# Patient Record
Sex: FEMALE | Race: BLACK OR AFRICAN AMERICAN | Employment: PART TIME | URBAN - METROPOLITAN AREA
[De-identification: names, ages, dates, MRNs, and addresses within clinical notes are randomized per-mention and may not be internally consistent; named-entity substitution may affect disease eponyms.]

---

## 2020-05-31 ENCOUNTER — APPOINTMENT (OUTPATIENT)
Dept: GENERAL RADIOLOGY | Age: 35
End: 2020-05-31
Attending: EMERGENCY MEDICINE
Payer: SUBSIDIZED

## 2020-05-31 ENCOUNTER — APPOINTMENT (OUTPATIENT)
Dept: CT IMAGING | Age: 35
End: 2020-05-31
Attending: EMERGENCY MEDICINE
Payer: SUBSIDIZED

## 2020-05-31 ENCOUNTER — HOSPITAL ENCOUNTER (EMERGENCY)
Age: 35
Discharge: ANOTHER HOSPITAL | End: 2020-05-31
Attending: EMERGENCY MEDICINE
Payer: SUBSIDIZED

## 2020-05-31 ENCOUNTER — HOSPITAL ENCOUNTER (INPATIENT)
Age: 35
LOS: 3 days | Discharge: HOME | DRG: 175 | End: 2020-06-03
Attending: HOSPITALIST | Admitting: FAMILY MEDICINE
Payer: SUBSIDIZED

## 2020-05-31 VITALS
DIASTOLIC BLOOD PRESSURE: 93 MMHG | HEIGHT: 64 IN | OXYGEN SATURATION: 100 % | HEART RATE: 125 BPM | WEIGHT: 240 LBS | SYSTOLIC BLOOD PRESSURE: 132 MMHG | TEMPERATURE: 98.4 F | RESPIRATION RATE: 24 BRPM | BODY MASS INDEX: 40.97 KG/M2

## 2020-05-31 DIAGNOSIS — I26.92 ACUTE SADDLE PULMONARY EMBOLISM, UNSPECIFIED WHETHER ACUTE COR PULMONALE PRESENT (HCC): Primary | ICD-10-CM

## 2020-05-31 DIAGNOSIS — R09.02 HYPOXEMIA: ICD-10-CM

## 2020-05-31 LAB
ALBUMIN SERPL-MCNC: 3.5 G/DL (ref 3.5–5)
ALBUMIN/GLOB SERPL: 0.7 {RATIO} (ref 1.2–3.5)
ALP SERPL-CCNC: 83 U/L (ref 50–136)
ALT SERPL-CCNC: 15 U/L (ref 12–65)
ANION GAP SERPL CALC-SCNC: 5 MMOL/L (ref 7–16)
APTT PPP: 27.3 SEC (ref 24.3–35.4)
AST SERPL-CCNC: 11 U/L (ref 15–37)
BASOPHILS # BLD: 0 K/UL (ref 0–0.2)
BASOPHILS NFR BLD: 0 % (ref 0–2)
BILIRUB SERPL-MCNC: 0.4 MG/DL (ref 0.2–1.1)
BUN SERPL-MCNC: 9 MG/DL (ref 6–23)
CALCIUM SERPL-MCNC: 9 MG/DL (ref 8.3–10.4)
CHLORIDE SERPL-SCNC: 106 MMOL/L (ref 98–107)
CO2 SERPL-SCNC: 26 MMOL/L (ref 21–32)
CREAT SERPL-MCNC: 0.9 MG/DL (ref 0.6–1)
DIFFERENTIAL METHOD BLD: ABNORMAL
EOSINOPHIL # BLD: 0 K/UL (ref 0–0.8)
EOSINOPHIL NFR BLD: 0 % (ref 0.5–7.8)
ERYTHROCYTE [DISTWIDTH] IN BLOOD BY AUTOMATED COUNT: 13.4 % (ref 11.9–14.6)
GLOBULIN SER CALC-MCNC: 4.7 G/DL (ref 2.3–3.5)
GLUCOSE SERPL-MCNC: 159 MG/DL (ref 65–100)
HCT VFR BLD AUTO: 39.3 % (ref 35.8–46.3)
HGB BLD-MCNC: 12.2 G/DL (ref 11.7–15.4)
IMM GRANULOCYTES # BLD AUTO: 0.1 K/UL (ref 0–0.5)
IMM GRANULOCYTES NFR BLD AUTO: 1 % (ref 0–5)
INR PPP: 1
LYMPHOCYTES # BLD: 1.2 K/UL (ref 0.5–4.6)
LYMPHOCYTES NFR BLD: 8 % (ref 13–44)
MCH RBC QN AUTO: 27.9 PG (ref 26.1–32.9)
MCHC RBC AUTO-ENTMCNC: 31 G/DL (ref 31.4–35)
MCV RBC AUTO: 89.7 FL (ref 79.6–97.8)
MONOCYTES # BLD: 0.5 K/UL (ref 0.1–1.3)
MONOCYTES NFR BLD: 3 % (ref 4–12)
NEUTS SEG # BLD: 13.8 K/UL (ref 1.7–8.2)
NEUTS SEG NFR BLD: 89 % (ref 43–78)
NRBC # BLD: 0 K/UL (ref 0–0.2)
PLATELET # BLD AUTO: 293 K/UL (ref 150–450)
PMV BLD AUTO: 10.1 FL (ref 9.4–12.3)
POTASSIUM SERPL-SCNC: 4.8 MMOL/L (ref 3.5–5.1)
PROT SERPL-MCNC: 8.2 G/DL (ref 6.3–8.2)
PROTHROMBIN TIME: 13.4 SEC (ref 12–14.7)
RBC # BLD AUTO: 4.38 M/UL (ref 4.05–5.2)
SODIUM SERPL-SCNC: 137 MMOL/L (ref 136–145)
TROPONIN-HIGH SENSITIVITY: 58.1 PG/ML (ref 0–14)
WBC # BLD AUTO: 15.5 K/UL (ref 4.3–11.1)

## 2020-05-31 PROCEDURE — 96376 TX/PRO/DX INJ SAME DRUG ADON: CPT

## 2020-05-31 PROCEDURE — 74011000258 HC RX REV CODE- 258: Performed by: EMERGENCY MEDICINE

## 2020-05-31 PROCEDURE — 74011250636 HC RX REV CODE- 250/636: Performed by: EMERGENCY MEDICINE

## 2020-05-31 PROCEDURE — 71260 CT THORAX DX C+: CPT

## 2020-05-31 PROCEDURE — 93005 ELECTROCARDIOGRAM TRACING: CPT | Performed by: EMERGENCY MEDICINE

## 2020-05-31 PROCEDURE — 85730 THROMBOPLASTIN TIME PARTIAL: CPT

## 2020-05-31 PROCEDURE — 65660000000 HC RM CCU STEPDOWN

## 2020-05-31 PROCEDURE — 84484 ASSAY OF TROPONIN QUANT: CPT

## 2020-05-31 PROCEDURE — 85610 PROTHROMBIN TIME: CPT

## 2020-05-31 PROCEDURE — 74011250637 HC RX REV CODE- 250/637: Performed by: FAMILY MEDICINE

## 2020-05-31 PROCEDURE — 74011636320 HC RX REV CODE- 636/320: Performed by: EMERGENCY MEDICINE

## 2020-05-31 PROCEDURE — 85025 COMPLETE CBC W/AUTO DIFF WBC: CPT

## 2020-05-31 PROCEDURE — 80053 COMPREHEN METABOLIC PANEL: CPT

## 2020-05-31 PROCEDURE — 99285 EMERGENCY DEPT VISIT HI MDM: CPT

## 2020-05-31 PROCEDURE — 96365 THER/PROPH/DIAG IV INF INIT: CPT

## 2020-05-31 RX ORDER — ONDANSETRON 2 MG/ML
4 INJECTION INTRAMUSCULAR; INTRAVENOUS
Status: DISCONTINUED | OUTPATIENT
Start: 2020-05-31 | End: 2020-06-03 | Stop reason: HOSPADM

## 2020-05-31 RX ORDER — HEPARIN SODIUM 5000 [USP'U]/100ML
18-36 INJECTION, SOLUTION INTRAVENOUS
Status: DISCONTINUED | OUTPATIENT
Start: 2020-05-31 | End: 2020-05-31 | Stop reason: HOSPADM

## 2020-05-31 RX ORDER — NALOXONE HYDROCHLORIDE 0.4 MG/ML
0.4 INJECTION, SOLUTION INTRAMUSCULAR; INTRAVENOUS; SUBCUTANEOUS AS NEEDED
Status: DISCONTINUED | OUTPATIENT
Start: 2020-05-31 | End: 2020-06-03 | Stop reason: HOSPADM

## 2020-05-31 RX ORDER — HEPARIN SODIUM 5000 [USP'U]/ML
80 INJECTION, SOLUTION INTRAVENOUS; SUBCUTANEOUS ONCE
Status: COMPLETED | OUTPATIENT
Start: 2020-05-31 | End: 2020-05-31

## 2020-05-31 RX ORDER — SODIUM CHLORIDE 0.9 % (FLUSH) 0.9 %
5-40 SYRINGE (ML) INJECTION EVERY 8 HOURS
Status: DISCONTINUED | OUTPATIENT
Start: 2020-06-01 | End: 2020-06-03 | Stop reason: HOSPADM

## 2020-05-31 RX ORDER — OXYCODONE HYDROCHLORIDE 5 MG/1
10 TABLET ORAL
Status: DISCONTINUED | OUTPATIENT
Start: 2020-05-31 | End: 2020-06-01

## 2020-05-31 RX ORDER — SODIUM CHLORIDE 0.9 % (FLUSH) 0.9 %
5-40 SYRINGE (ML) INJECTION AS NEEDED
Status: DISCONTINUED | OUTPATIENT
Start: 2020-05-31 | End: 2020-06-03 | Stop reason: HOSPADM

## 2020-05-31 RX ORDER — SODIUM CHLORIDE 0.9 % (FLUSH) 0.9 %
10 SYRINGE (ML) INJECTION
Status: COMPLETED | OUTPATIENT
Start: 2020-05-31 | End: 2020-05-31

## 2020-05-31 RX ORDER — HEPARIN SODIUM 5000 [USP'U]/100ML
18-36 INJECTION, SOLUTION INTRAVENOUS
Status: DISCONTINUED | OUTPATIENT
Start: 2020-06-01 | End: 2020-06-01

## 2020-05-31 RX ORDER — ACETAMINOPHEN 325 MG/1
650 TABLET ORAL
Status: DISCONTINUED | OUTPATIENT
Start: 2020-05-31 | End: 2020-06-01

## 2020-05-31 RX ADMIN — HEPARIN SODIUM 18 UNITS/KG/HR: 5000 INJECTION, SOLUTION INTRAVENOUS at 18:50

## 2020-05-31 RX ADMIN — SODIUM CHLORIDE 100 ML: 900 INJECTION, SOLUTION INTRAVENOUS at 18:12

## 2020-05-31 RX ADMIN — IOPAMIDOL 100 ML: 755 INJECTION, SOLUTION INTRAVENOUS at 18:12

## 2020-05-31 RX ADMIN — HEPARIN SODIUM 8700 UNITS: 5000 INJECTION INTRAVENOUS; SUBCUTANEOUS at 18:48

## 2020-05-31 RX ADMIN — ACETAMINOPHEN 650 MG: 325 TABLET, FILM COATED ORAL at 23:35

## 2020-05-31 RX ADMIN — Medication 10 ML: at 18:12

## 2020-05-31 NOTE — ED NOTES
TRANSFER - OUT REPORT:    Verbal report given to Herbert Bourgeois on [facility]  being transferred to [address] [place] [address] for routine progression of care       Report consisted of patients Situation, Background, Assessment and   Recommendations(SBAR). Information from the following report(s) SBAR, ED Summary, MAR, Recent Results and Cardiac Rhythm Tachycardia was reviewed with the receiving nurse. Lines:   Peripheral IV 05/31/20 Right Antecubital (Active)   Site Assessment Clean, dry, & intact 5/31/2020  5:24 PM   Phlebitis Assessment 0 5/31/2020  5:24 PM   Infiltration Assessment 0 5/31/2020  5:24 PM   Dressing Status Clean, dry, & intact 5/31/2020  5:24 PM   Dressing Type Transparent 5/31/2020  5:24 PM   Hub Color/Line Status Pink 5/31/2020  5:24 PM   Action Taken Blood drawn 5/31/2020  5:24 PM       Peripheral IV 05/31/20 Left;Upper Arm (Active)   Site Assessment Clean, dry, & intact 5/31/2020  6:39 PM   Phlebitis Assessment 0 5/31/2020  6:39 PM   Infiltration Assessment 0 5/31/2020  6:39 PM   Dressing Status Clean, dry, & intact 5/31/2020  6:39 PM   Dressing Type Transparent 5/31/2020  6:39 PM   Hub Color/Line Status Blue 5/31/2020  6:39 PM        Opportunity for questions and clarification was provided.       Patient transported with:   Monitor  O2 @ 2 liters   [employer]

## 2020-05-31 NOTE — ED NOTES
Report taken from [facility]. Heparin rate verify by this nurse and Marco A Dixon. Patient has no needs at this time.

## 2020-05-31 NOTE — ED PROVIDER NOTES
53-year-old female awoke today with some shortness of breath and palpitations. Also pain in the right calf. Patient has history of pulmonary embolism x2. She has been off her anticoagulant for a week. Denies any chest pain or syncope. No fever chills or cough. No trauma. Denies other medical problems. Denies possibility pregnancy. The history is provided by the patient. Shortness of Breath   This is a new problem. The problem occurs rarely. The current episode started 6 to 12 hours ago. The problem has not changed since onset. Associated symptoms include leg pain. Pertinent negatives include no fever, no headaches, no neck pain, no cough, no sputum production, no hemoptysis, no wheezing, no orthopnea, no chest pain (Slight chest pressure but no pain per patient), no syncope, no vomiting, no abdominal pain and no rash. She has tried nothing for the symptoms. Associated medical issues include PE and DVT. Associated medical issues do not include asthma, COPD, heart failure or recent surgery. Leg Pain    Pertinent negatives include no back pain and no neck pain. Past Medical History:   Diagnosis Date    Hx of blood clots        History reviewed. No pertinent surgical history. History reviewed. No pertinent family history.     Social History     Socioeconomic History    Marital status: Not on file     Spouse name: Not on file    Number of children: Not on file    Years of education: Not on file    Highest education level: Not on file   Occupational History    Not on file   Social Needs    Financial resource strain: Not on file    Food insecurity     Worry: Not on file     Inability: Not on file    Transportation needs     Medical: Not on file     Non-medical: Not on file   Tobacco Use    Smoking status: Light Tobacco Smoker    Smokeless tobacco: Never Used   Substance and Sexual Activity    Alcohol use: Never     Frequency: Never    Drug use: Never    Sexual activity: Not on file Lifestyle    Physical activity     Days per week: Not on file     Minutes per session: Not on file    Stress: Not on file   Relationships    Social connections     Talks on phone: Not on file     Gets together: Not on file     Attends Shinto service: Not on file     Active member of club or organization: Not on file     Attends meetings of clubs or organizations: Not on file     Relationship status: Not on file    Intimate partner violence     Fear of current or ex partner: Not on file     Emotionally abused: Not on file     Physically abused: Not on file     Forced sexual activity: Not on file   Other Topics Concern    Not on file   Social History Narrative    Not on file         ALLERGIES: Patient has no known allergies. Review of Systems   Constitutional: Negative for chills and fever. Respiratory: Positive for shortness of breath. Negative for cough, hemoptysis, sputum production and wheezing. Cardiovascular: Positive for palpitations. Negative for chest pain (Slight chest pressure but no pain per patient), orthopnea and syncope. Gastrointestinal: Negative for abdominal pain, diarrhea and vomiting. Genitourinary: Negative for dysuria and flank pain. Musculoskeletal: Positive for myalgias. Negative for back pain and neck pain. Skin: Negative for color change and rash. Neurological: Negative for syncope and headaches. All other systems reviewed and are negative. Vitals:    05/31/20 1708   BP: 138/85   Pulse: (!) 136   Resp: 18   Temp: 98.4 °F (36.9 °C)   SpO2: (!) 83%   Weight: 108.9 kg (240 lb)   Height: 5' 4\" (1.626 m)            Physical Exam  Vitals signs and nursing note reviewed. Constitutional:       General: She is not in acute distress. Appearance: She is well-developed. HENT:      Head: Normocephalic and atraumatic. Right Ear: External ear normal.      Left Ear: External ear normal.      Mouth/Throat:      Pharynx: No oropharyngeal exudate.    Eyes: Conjunctiva/sclera: Conjunctivae normal.      Pupils: Pupils are equal, round, and reactive to light. Neck:      Musculoskeletal: Normal range of motion and neck supple. Cardiovascular:      Rate and Rhythm: Regular rhythm. Tachycardia present. Heart sounds: No murmur. Pulmonary:      Effort: No respiratory distress. Breath sounds: Normal breath sounds. Abdominal:      General: Bowel sounds are normal.      Palpations: Abdomen is soft. There is no mass. Tenderness: There is no abdominal tenderness. There is no guarding or rebound. Hernia: No hernia is present. Musculoskeletal:      Right lower leg: She exhibits tenderness. No edema. Skin:     General: Skin is warm and dry. Neurological:      Mental Status: She is alert and oriented to person, place, and time. Gait: Gait normal.      Comments: Nl speech   Psychiatric:         Speech: Speech normal.          MDM  Number of Diagnoses or Management Options  Diagnosis management comments: Concern for recurrent pulmonary embolism. Patient high risk. Believe CT scan indicated.        Amount and/or Complexity of Data Reviewed  Clinical lab tests: ordered and reviewed  Tests in the radiology section of CPT®: ordered and reviewed  Tests in the medicine section of CPT®: ordered and reviewed  Independent visualization of images, tracings, or specimens: yes    Risk of Complications, Morbidity, and/or Mortality  Presenting problems: moderate  Diagnostic procedures: low  Management options: moderate    Patient Progress  Patient progress: stable         Procedures    Results Include:    Recent Results (from the past 24 hour(s))   CBC WITH AUTOMATED DIFF    Collection Time: 05/31/20  5:26 PM   Result Value Ref Range    WBC 15.5 (H) 4.3 - 11.1 K/uL    RBC 4.38 4.05 - 5.2 M/uL    HGB 12.2 11.7 - 15.4 g/dL    HCT 39.3 35.8 - 46.3 %    MCV 89.7 79.6 - 97.8 FL    MCH 27.9 26.1 - 32.9 PG    MCHC 31.0 (L) 31.4 - 35.0 g/dL    RDW 13.4 11.9 - 14.6 % PLATELET 155 383 - 963 K/uL    MPV 10.1 9.4 - 12.3 FL    ABSOLUTE NRBC 0.00 0.0 - 0.2 K/uL    DF AUTOMATED      NEUTROPHILS 89 (H) 43 - 78 %    LYMPHOCYTES 8 (L) 13 - 44 %    MONOCYTES 3 (L) 4.0 - 12.0 %    EOSINOPHILS 0 (L) 0.5 - 7.8 %    BASOPHILS 0 0.0 - 2.0 %    IMMATURE GRANULOCYTES 1 0.0 - 5.0 %    ABS. NEUTROPHILS 13.8 (H) 1.7 - 8.2 K/UL    ABS. LYMPHOCYTES 1.2 0.5 - 4.6 K/UL    ABS. MONOCYTES 0.5 0.1 - 1.3 K/UL    ABS. EOSINOPHILS 0.0 0.0 - 0.8 K/UL    ABS. BASOPHILS 0.0 0.0 - 0.2 K/UL    ABS. IMM. GRANS. 0.1 0.0 - 0.5 K/UL   METABOLIC PANEL, COMPREHENSIVE    Collection Time: 05/31/20  5:26 PM   Result Value Ref Range    Sodium 137 136 - 145 mmol/L    Potassium 4.8 3.5 - 5.1 mmol/L    Chloride 106 98 - 107 mmol/L    CO2 26 21 - 32 mmol/L    Anion gap 5 (L) 7 - 16 mmol/L    Glucose 159 (H) 65 - 100 mg/dL    BUN 9 6 - 23 MG/DL    Creatinine 0.90 0.6 - 1.0 MG/DL    GFR est AA >60 >60 ml/min/1.73m2    GFR est non-AA >60 >60 ml/min/1.73m2    Calcium 9.0 8.3 - 10.4 MG/DL    Bilirubin, total 0.4 0.2 - 1.1 MG/DL    ALT (SGPT) 15 12 - 65 U/L    AST (SGOT) 11 (L) 15 - 37 U/L    Alk. phosphatase 83 50 - 136 U/L    Protein, total 8.2 6.3 - 8.2 g/dL    Albumin 3.5 3.5 - 5.0 g/dL    Globulin 4.7 (H) 2.3 - 3.5 g/dL    A-G Ratio 0.7 (L) 1.2 - 3.5     PROTHROMBIN TIME + INR    Collection Time: 05/31/20  5:26 PM   Result Value Ref Range    Prothrombin time 13.4 12.0 - 14.7 sec    INR 1.0     PTT    Collection Time: 05/31/20  5:26 PM   Result Value Ref Range    aPTT 27.3 24.3 - 35.4 SEC   TROPONIN-HIGH SENSITIVITY    Collection Time: 05/31/20  5:26 PM   Result Value Ref Range    Troponin-High Sensitivity 58.1 (HH) 0 - 14 pg/mL     6:42 PM  Report from radiologist is saddle embolus with right heart strain. Heparin bolus and infusion immediately ordered. Patient has point still with heart rate of 130. Saturation about 95% on 2-1/2 L. Discussed with pulmonologist who will asked hospitalist to admit.   Patient had 2 PEs in the past is unsure of any cause and does not doubt that she has had a work-up. CRITICAL CARE Documentation: This patient is critically ill and there is a high probability of of imminent or life threatening deterioration in the patient's condition without immediate management. The nature of the patient's clinical problem is: Manera embolism, right heart strain, tachycardia and hypoxemia. I have spent 50 minutes in direct patient care, documentation, review of labs/xrays/old records, discussion with Family, Colleague . The time involved in the performance of separately reportable procedures was not counted toward critical care time. Jerson Hung MD; 5/31/2020 @6:42 PM       6:56 PM  Discussed with pulmonary and hospitalist.  Patient will be admitted. Notified IR. They request patient be n.p.o. after midnight.

## 2020-05-31 NOTE — ED TRIAGE NOTES
Pt c/o shortness of breath and pain in right calf that started this morning. Pt states she has a history of blood clots, last time being in 8/2019. Pt denies any long drives recently. Pt in wheelchair to room. Pt and visitor masked upon arrival to the ED. Pt states she is suppose to be taking Xarelto but has not taken it in a week due to being out.

## 2020-05-31 NOTE — ED NOTES
Report received from [facility], [facility]. Pt lying on stretcher in full cardiac monitoring. Pt with tachycardia and increased respiratory rate as before. Pt states that she only has shortness of breath with movement. Assisted pt to bedside commode without incident. Pt awaiting hospitalist assessment and transport to [facility]. Pt heparin infusion continues at 18 units/kg/hr. Med verfified with Shira Lora RN. See MAR. Will continue to monitor.

## 2020-05-31 NOTE — ED NOTES
Report to [facility]. All questions answered. Pt transported to [address] [place] [address].

## 2020-06-01 ENCOUNTER — APPOINTMENT (OUTPATIENT)
Dept: INTERVENTIONAL RADIOLOGY/VASCULAR | Age: 35
DRG: 175 | End: 2020-06-01
Attending: FAMILY MEDICINE
Payer: SUBSIDIZED

## 2020-06-01 ENCOUNTER — APPOINTMENT (OUTPATIENT)
Dept: ULTRASOUND IMAGING | Age: 35
DRG: 175 | End: 2020-06-01
Attending: HOSPITALIST
Payer: SUBSIDIZED

## 2020-06-01 LAB
ANION GAP SERPL CALC-SCNC: 8 MMOL/L (ref 7–16)
APTT PPP: 30.9 SEC (ref 24.3–35.4)
APTT PPP: 96 SEC (ref 24.3–35.4)
ATRIAL RATE: 136 BPM
BASOPHILS # BLD: 0 K/UL (ref 0–0.2)
BASOPHILS # BLD: 0 K/UL (ref 0–0.2)
BASOPHILS NFR BLD: 0 % (ref 0–2)
BASOPHILS NFR BLD: 0 % (ref 0–2)
BUN SERPL-MCNC: 10 MG/DL (ref 6–23)
CALCIUM SERPL-MCNC: 8.5 MG/DL (ref 8.3–10.4)
CALCULATED P AXIS, ECG09: 68 DEGREES
CALCULATED R AXIS, ECG10: 89 DEGREES
CALCULATED T AXIS, ECG11: 36 DEGREES
CHLORIDE SERPL-SCNC: 109 MMOL/L (ref 98–107)
CO2 SERPL-SCNC: 26 MMOL/L (ref 21–32)
CREAT SERPL-MCNC: 0.82 MG/DL (ref 0.6–1)
DIAGNOSIS, 93000: NORMAL
DIFFERENTIAL METHOD BLD: ABNORMAL
DIFFERENTIAL METHOD BLD: ABNORMAL
EOSINOPHIL # BLD: 0 K/UL (ref 0–0.8)
EOSINOPHIL # BLD: 0 K/UL (ref 0–0.8)
EOSINOPHIL NFR BLD: 0 % (ref 0.5–7.8)
EOSINOPHIL NFR BLD: 0 % (ref 0.5–7.8)
ERYTHROCYTE [DISTWIDTH] IN BLOOD BY AUTOMATED COUNT: 13.5 % (ref 11.9–14.6)
ERYTHROCYTE [DISTWIDTH] IN BLOOD BY AUTOMATED COUNT: 13.5 % (ref 11.9–14.6)
FIBRINOGEN PPP-MCNC: 448 MG/DL (ref 190–501)
GLUCOSE SERPL-MCNC: 107 MG/DL (ref 65–100)
HCT VFR BLD AUTO: 37.3 % (ref 35.8–46.3)
HCT VFR BLD AUTO: 37.5 % (ref 35.8–46.3)
HGB BLD-MCNC: 11.3 G/DL (ref 11.7–15.4)
HGB BLD-MCNC: 11.8 G/DL (ref 11.7–15.4)
IMM GRANULOCYTES # BLD AUTO: 0 K/UL (ref 0–0.5)
IMM GRANULOCYTES # BLD AUTO: 0.1 K/UL (ref 0–0.5)
IMM GRANULOCYTES NFR BLD AUTO: 0 % (ref 0–5)
IMM GRANULOCYTES NFR BLD AUTO: 1 % (ref 0–5)
LYMPHOCYTES # BLD: 1.8 K/UL (ref 0.5–4.6)
LYMPHOCYTES # BLD: 2.2 K/UL (ref 0.5–4.6)
LYMPHOCYTES NFR BLD: 18 % (ref 13–44)
LYMPHOCYTES NFR BLD: 20 % (ref 13–44)
MCH RBC QN AUTO: 27.2 PG (ref 26.1–32.9)
MCH RBC QN AUTO: 27.9 PG (ref 26.1–32.9)
MCHC RBC AUTO-ENTMCNC: 30.3 G/DL (ref 31.4–35)
MCHC RBC AUTO-ENTMCNC: 31.5 G/DL (ref 31.4–35)
MCV RBC AUTO: 88.7 FL (ref 79.6–97.8)
MCV RBC AUTO: 89.9 FL (ref 79.6–97.8)
MONOCYTES # BLD: 0.5 K/UL (ref 0.1–1.3)
MONOCYTES # BLD: 0.7 K/UL (ref 0.1–1.3)
MONOCYTES NFR BLD: 5 % (ref 4–12)
MONOCYTES NFR BLD: 6 % (ref 4–12)
NEUTS SEG # BLD: 7.8 K/UL (ref 1.7–8.2)
NEUTS SEG # BLD: 7.9 K/UL (ref 1.7–8.2)
NEUTS SEG NFR BLD: 73 % (ref 43–78)
NEUTS SEG NFR BLD: 77 % (ref 43–78)
NRBC # BLD: 0 K/UL (ref 0–0.2)
NRBC # BLD: 0 K/UL (ref 0–0.2)
P-R INTERVAL, ECG05: 136 MS
PLATELET # BLD AUTO: 249 K/UL (ref 150–450)
PLATELET # BLD AUTO: 276 K/UL (ref 150–450)
PMV BLD AUTO: 10.6 FL (ref 9.4–12.3)
PMV BLD AUTO: 10.6 FL (ref 9.4–12.3)
POTASSIUM SERPL-SCNC: 4.4 MMOL/L (ref 3.5–5.1)
Q-T INTERVAL, ECG07: 292 MS
QRS DURATION, ECG06: 70 MS
QTC CALCULATION (BEZET), ECG08: 439 MS
RBC # BLD AUTO: 4.15 M/UL (ref 4.05–5.2)
RBC # BLD AUTO: 4.23 M/UL (ref 4.05–5.2)
SODIUM SERPL-SCNC: 143 MMOL/L (ref 136–145)
VENTRICULAR RATE, ECG03: 136 BPM
WBC # BLD AUTO: 10.2 K/UL (ref 4.3–11.1)
WBC # BLD AUTO: 10.9 K/UL (ref 4.3–11.1)

## 2020-06-01 PROCEDURE — 3E06317 INTRODUCTION OF OTHER THROMBOLYTIC INTO CENTRAL ARTERY, PERCUTANEOUS APPROACH: ICD-10-PCS | Performed by: RADIOLOGY

## 2020-06-01 PROCEDURE — 02HP33Z INSERTION OF INFUSION DEVICE INTO PULMONARY TRUNK, PERCUTANEOUS APPROACH: ICD-10-PCS | Performed by: RADIOLOGY

## 2020-06-01 PROCEDURE — 77030004558 HC CATH ANGI DX SUPR TORQ CARD -A

## 2020-06-01 PROCEDURE — 93306 TTE W/DOPPLER COMPLETE: CPT

## 2020-06-01 PROCEDURE — C1894 INTRO/SHEATH, NON-LASER: HCPCS

## 2020-06-01 PROCEDURE — 74011636320 HC RX REV CODE- 636/320: Performed by: RADIOLOGY

## 2020-06-01 PROCEDURE — 85025 COMPLETE CBC W/AUTO DIFF WBC: CPT

## 2020-06-01 PROCEDURE — 65620000000 HC RM CCU GENERAL

## 2020-06-01 PROCEDURE — 80048 BASIC METABOLIC PNL TOTAL CA: CPT

## 2020-06-01 PROCEDURE — C1769 GUIDE WIRE: HCPCS

## 2020-06-01 PROCEDURE — 85730 THROMBOPLASTIN TIME PARTIAL: CPT

## 2020-06-01 PROCEDURE — 36592 COLLECT BLOOD FROM PICC: CPT

## 2020-06-01 PROCEDURE — 36415 COLL VENOUS BLD VENIPUNCTURE: CPT

## 2020-06-01 PROCEDURE — 74011250636 HC RX REV CODE- 250/636: Performed by: RADIOLOGY

## 2020-06-01 PROCEDURE — 74011000250 HC RX REV CODE- 250: Performed by: RADIOLOGY

## 2020-06-01 PROCEDURE — 74011250636 HC RX REV CODE- 250/636: Performed by: HOSPITALIST

## 2020-06-01 PROCEDURE — 93970 EXTREMITY STUDY: CPT

## 2020-06-01 PROCEDURE — 85384 FIBRINOGEN ACTIVITY: CPT

## 2020-06-01 PROCEDURE — 74011250636 HC RX REV CODE- 250/636: Performed by: FAMILY MEDICINE

## 2020-06-01 PROCEDURE — 75746 ARTERY X-RAYS LUNG: CPT

## 2020-06-01 PROCEDURE — 76937 US GUIDE VASCULAR ACCESS: CPT

## 2020-06-01 RX ORDER — SODIUM CHLORIDE, SODIUM LACTATE, POTASSIUM CHLORIDE, CALCIUM CHLORIDE 600; 310; 30; 20 MG/100ML; MG/100ML; MG/100ML; MG/100ML
100 INJECTION, SOLUTION INTRAVENOUS CONTINUOUS
Status: DISCONTINUED | OUTPATIENT
Start: 2020-06-01 | End: 2020-06-01

## 2020-06-01 RX ORDER — MIDAZOLAM HYDROCHLORIDE 1 MG/ML
.5-2 INJECTION, SOLUTION INTRAMUSCULAR; INTRAVENOUS
Status: DISCONTINUED | OUTPATIENT
Start: 2020-06-01 | End: 2020-06-01

## 2020-06-01 RX ORDER — OXYCODONE HYDROCHLORIDE 5 MG/1
5 TABLET ORAL
Status: DISCONTINUED | OUTPATIENT
Start: 2020-06-01 | End: 2020-06-03 | Stop reason: HOSPADM

## 2020-06-01 RX ORDER — HEPARIN SODIUM 5000 [USP'U]/100ML
300 INJECTION, SOLUTION INTRAVENOUS CONTINUOUS
Status: DISCONTINUED | OUTPATIENT
Start: 2020-06-01 | End: 2020-06-02

## 2020-06-01 RX ORDER — MORPHINE SULFATE 2 MG/ML
2 INJECTION, SOLUTION INTRAMUSCULAR; INTRAVENOUS
Status: DISCONTINUED | OUTPATIENT
Start: 2020-06-01 | End: 2020-06-03 | Stop reason: HOSPADM

## 2020-06-01 RX ORDER — LIDOCAINE HYDROCHLORIDE 20 MG/ML
2-20 INJECTION, SOLUTION INFILTRATION; PERINEURAL
Status: DISCONTINUED | OUTPATIENT
Start: 2020-06-01 | End: 2020-06-01 | Stop reason: ALTCHOICE

## 2020-06-01 RX ORDER — SODIUM CHLORIDE 9 MG/ML
100 INJECTION, SOLUTION INTRAVENOUS CONTINUOUS
Status: DISCONTINUED | OUTPATIENT
Start: 2020-06-01 | End: 2020-06-02

## 2020-06-01 RX ORDER — SODIUM CHLORIDE 9 MG/ML
25 INJECTION, SOLUTION INTRAVENOUS ONCE
Status: COMPLETED | OUTPATIENT
Start: 2020-06-01 | End: 2020-06-01

## 2020-06-01 RX ORDER — FENTANYL CITRATE 50 UG/ML
25-100 INJECTION, SOLUTION INTRAMUSCULAR; INTRAVENOUS
Status: DISCONTINUED | OUTPATIENT
Start: 2020-06-01 | End: 2020-06-01 | Stop reason: ALTCHOICE

## 2020-06-01 RX ORDER — LORAZEPAM 2 MG/ML
1 INJECTION INTRAMUSCULAR
Status: DISCONTINUED | OUTPATIENT
Start: 2020-06-01 | End: 2020-06-03 | Stop reason: HOSPADM

## 2020-06-01 RX ORDER — HEPARIN SODIUM 200 [USP'U]/100ML
600 INJECTION, SOLUTION INTRAVENOUS AS NEEDED
Status: DISCONTINUED | OUTPATIENT
Start: 2020-06-01 | End: 2020-06-01 | Stop reason: ALTCHOICE

## 2020-06-01 RX ORDER — HYDROMORPHONE HYDROCHLORIDE 1 MG/ML
1 INJECTION, SOLUTION INTRAMUSCULAR; INTRAVENOUS; SUBCUTANEOUS
Status: DISCONTINUED | OUTPATIENT
Start: 2020-06-01 | End: 2020-06-03 | Stop reason: HOSPADM

## 2020-06-01 RX ORDER — DIPHENHYDRAMINE HYDROCHLORIDE 50 MG/ML
25-50 INJECTION, SOLUTION INTRAMUSCULAR; INTRAVENOUS ONCE
Status: COMPLETED | OUTPATIENT
Start: 2020-06-01 | End: 2020-06-01

## 2020-06-01 RX ORDER — ZOLPIDEM TARTRATE 5 MG/1
5 TABLET ORAL
Status: DISCONTINUED | OUTPATIENT
Start: 2020-06-01 | End: 2020-06-03 | Stop reason: HOSPADM

## 2020-06-01 RX ORDER — ACETAMINOPHEN 325 MG/1
650 TABLET ORAL
Status: DISCONTINUED | OUTPATIENT
Start: 2020-06-01 | End: 2020-06-03 | Stop reason: HOSPADM

## 2020-06-01 RX ADMIN — Medication 10 ML: at 22:00

## 2020-06-01 RX ADMIN — SODIUM CHLORIDE, SODIUM LACTATE, POTASSIUM CHLORIDE, AND CALCIUM CHLORIDE 100 ML/HR: 600; 310; 30; 20 INJECTION, SOLUTION INTRAVENOUS at 10:07

## 2020-06-01 RX ADMIN — ALTEPLASE 0.5 MG/HR: 2.2 INJECTION, POWDER, LYOPHILIZED, FOR SOLUTION INTRAVENOUS at 17:44

## 2020-06-01 RX ADMIN — SODIUM CHLORIDE 25 ML/HR: 900 INJECTION, SOLUTION INTRAVENOUS at 16:20

## 2020-06-01 RX ADMIN — IOPAMIDOL 12 ML: 612 INJECTION, SOLUTION INTRAVENOUS at 16:45

## 2020-06-01 RX ADMIN — MIDAZOLAM 1 MG: 1 INJECTION INTRAMUSCULAR; INTRAVENOUS at 16:30

## 2020-06-01 RX ADMIN — FENTANYL CITRATE 50 MCG: 0.05 INJECTION, SOLUTION INTRAMUSCULAR; INTRAVENOUS at 16:24

## 2020-06-01 RX ADMIN — Medication 5 ML: at 04:48

## 2020-06-01 RX ADMIN — HEPARIN SODIUM 300 UNITS/HR: 5000 INJECTION, SOLUTION INTRAVENOUS at 16:50

## 2020-06-01 RX ADMIN — HEPARIN SODIUM 18 UNITS/KG/HR: 5000 INJECTION, SOLUTION INTRAVENOUS at 02:30

## 2020-06-01 RX ADMIN — ALTEPLASE 0.5 MG/HR: 2.2 INJECTION, POWDER, LYOPHILIZED, FOR SOLUTION INTRAVENOUS at 16:48

## 2020-06-01 RX ADMIN — MIDAZOLAM 1 MG: 1 INJECTION INTRAMUSCULAR; INTRAVENOUS at 16:24

## 2020-06-01 RX ADMIN — Medication 10 ML: at 13:56

## 2020-06-01 RX ADMIN — DIPHENHYDRAMINE HYDROCHLORIDE 50 MG: 50 INJECTION, SOLUTION INTRAMUSCULAR; INTRAVENOUS at 16:18

## 2020-06-01 RX ADMIN — FENTANYL CITRATE 50 MCG: 0.05 INJECTION, SOLUTION INTRAMUSCULAR; INTRAVENOUS at 16:30

## 2020-06-01 RX ADMIN — LIDOCAINE HYDROCHLORIDE 50 MG: 20 INJECTION, SOLUTION INFILTRATION; PERINEURAL at 16:44

## 2020-06-01 NOTE — PROCEDURES
Department of Interventional Radiology  [phone]        Interventional Radiology Brief Procedure Note    Patient: Brooks Richards MRN: [id number]  SSN: [id number]    YOB: 1985  Age: 38 y.o. Sex: female      Date of Procedure: 6/1/2020    Pre-Procedure Diagnosis: Bi PE with a slender saddle portion. History of Bi PE in 8/19. Post-Procedure Diagnosis: SAME    Procedure(s): Pulmonary Arteriogram, catheter placement, thrombolysis initiation. Brief Description of Procedure: as above    Performed By: Tarsha Adams MD     Assistants: None    Anesthesia:Moderate Sedation    Estimated Blood Loss: Less than 10ml    Specimens:  None    Implants:  None    Findings: catheter is appropriately positioned. Complications: None    Recommendations: tPA at 0.5 mg/hr. Heparin at 300 U/hr. Follow Up: PAgram tomorrow afternoon.       Signed By: Tarsha Adams MD     June 1, 2020

## 2020-06-01 NOTE — PROGRESS NOTES
TRANSFER - OUT REPORT:    Verbal report given to Yenni Poon RN(name) on Alexa Richmond  being transferred to CCU(unit) for routine progression of care       Report consisted of patients Situation, Background, Assessment and   Recommendations(SBAR). Information from the following report(s) SBAR, Procedure Summary, MAR and Cardiac Rhythm ST was reviewed with the receiving nurse. Opportunity for questions and clarification was provided. Conscious Sedation:   100 Mcg of Fentanyl administered  2 Mg of Versed administered    Pt tolerated procedure well.      Visit Vitals  /80   Pulse 100   Temp 98 °F (36.7 °C)   Resp (!) 35   Ht 5' 4\" (1.626 m)   Wt 108.9 kg (240 lb)   SpO2 95%   BMI 41.20 kg/m²     Past Medical History:   Diagnosis Date    Hx of blood clots      Peripheral IV 05/31/20 Anterior;Proximal;Right Forearm (Active)   Site Assessment Clean, dry, & intact 6/1/2020  3:14 PM   Phlebitis Assessment 0 6/1/2020  3:14 PM   Infiltration Assessment 0 6/1/2020  3:14 PM   Dressing Status Clean, dry, & intact 6/1/2020  3:14 PM   Dressing Type Transparent 6/1/2020  3:14 PM   Hub Color/Line Status Infusing 6/1/2020  3:14 PM       Peripheral IV 05/31/20 Left;Proximal;Upper (Active)   Site Assessment Clean, dry, & intact 6/1/2020  3:14 PM   Phlebitis Assessment 0 6/1/2020  3:14 PM   Infiltration Assessment 0 6/1/2020  3:14 PM   Dressing Status Clean, dry, & intact 6/1/2020  3:14 PM   Dressing Type Transparent 6/1/2020  3:14 PM   Hub Color/Line Status Infusing 6/1/2020  3:14 PM           Sheath 06/01/20 (Active)

## 2020-06-01 NOTE — H&P
HOSPITALIST H&P  NAME:  Natali Casanova   Age:  38 y.o.  :   1985   MRN:   [id number]  PCP: None  Treatment Team: Attending Provider: Shanita John MD    No Order     CC: Reason for admission is: saddle PE    HPI:   Patient history was obtained from the ER provider prior to seeing the patient. Patient is a 38 y.o. female who presents to the ER due to Right calf pain and SOB. She reports a h/o PE and is usually on Xarelto, she stopped taking it about a week ago when she ran out. She reports lying in bed and having the calf pain, and she is aware this is a sign of blood clot. She got up to go to bathroom and \"felt it hit her\". She had a sudden \"weird\" feeling and then started getting SOB. She reports that her first clot was last August and that she has had 4. No particular cause has been determined. She denies fever/chills, n/v/d, cough, chest pain, abdominal pain. ROS:  All systems have been reviewed and are negative except as stated in HPI or elsewhere. Past Medical History:   Diagnosis Date    Hx of blood clots       No past surgical history on file. Social History     Tobacco Use    Smoking status: Light Tobacco Smoker    Smokeless tobacco: Never Used   Substance Use Topics    Alcohol use: Never     Frequency: Never      No family history on file. FH Reviewed and non-contributory to admitting diagnosis    No Known Allergies   Cannot display prior to admission medications because the patient has not been admitted in this contact. Objective: Intake/Output Summary (Last 24 hours) at 2020  Last data filed at 2020  Gross per 24 hour   Intake 100 ml   Output    Net 100 ml      Temp (24hrs), Av.4 °F (36.9 °C), Min:98.4 °F (36.9 °C), Max:98.4 °F (36.9 °C)        There is no height or weight on file to calculate BMI. No data found. Physical Exam:    General:    WD and WN, No apparent distress.    Head:   Normocephalic, without obvious abnormality, atraumatic. Eyes:  PERRL; EOMI; sclera normal/non-icteric  ENT:  Hearing is normal.  Oropharynx is clear with tacky mucous membranes   Resp:    Clear to auscultation bilaterally. No Wheezing or Rhonchi. Resp are even and unlabored  Heart[de-identified]  Regular rate and rhythm,  no murmur,   No LE edema  Abdomen:   Soft, non-tender. Not distended. Bowel sounds normal.  hepato-splenomegaly cannot be assessed due to obesity     Musc/SK: Muscle strength is good and tone normal; No cyanosis. No clubbing  Skin:     Texture, turgor normal. No significant rashes or lesions. Capillary refill < 2 sec  Neurologic: CN II - XII are grossly intact - Eye exam as noted above  Psych: Alert and oriented x 4;  Judgement and insight are normal     Data Review:   Recent Results (from the past 24 hour(s))   CBC WITH AUTOMATED DIFF    Collection Time: 05/31/20  5:26 PM   Result Value Ref Range    WBC 15.5 (H) 4.3 - 11.1 K/uL    RBC 4.38 4.05 - 5.2 M/uL    HGB 12.2 11.7 - 15.4 g/dL    HCT 39.3 35.8 - 46.3 %    MCV 89.7 79.6 - 97.8 FL    MCH 27.9 26.1 - 32.9 PG    MCHC 31.0 (L) 31.4 - 35.0 g/dL    RDW 13.4 11.9 - 14.6 %    PLATELET 841 293 - 480 K/uL    MPV 10.1 9.4 - 12.3 FL    ABSOLUTE NRBC 0.00 0.0 - 0.2 K/uL    DF AUTOMATED      NEUTROPHILS 89 (H) 43 - 78 %    LYMPHOCYTES 8 (L) 13 - 44 %    MONOCYTES 3 (L) 4.0 - 12.0 %    EOSINOPHILS 0 (L) 0.5 - 7.8 %    BASOPHILS 0 0.0 - 2.0 %    IMMATURE GRANULOCYTES 1 0.0 - 5.0 %    ABS. NEUTROPHILS 13.8 (H) 1.7 - 8.2 K/UL    ABS. LYMPHOCYTES 1.2 0.5 - 4.6 K/UL    ABS. MONOCYTES 0.5 0.1 - 1.3 K/UL    ABS. EOSINOPHILS 0.0 0.0 - 0.8 K/UL    ABS. BASOPHILS 0.0 0.0 - 0.2 K/UL    ABS. IMM.  GRANS. 0.1 0.0 - 0.5 K/UL   METABOLIC PANEL, COMPREHENSIVE    Collection Time: 05/31/20  5:26 PM   Result Value Ref Range    Sodium 137 136 - 145 mmol/L    Potassium 4.8 3.5 - 5.1 mmol/L    Chloride 106 98 - 107 mmol/L    CO2 26 21 - 32 mmol/L    Anion gap 5 (L) 7 - 16 mmol/L    Glucose 159 (H) 65 - 100 mg/dL    BUN 9 6 - 23 MG/DL    Creatinine 0.90 0.6 - 1.0 MG/DL    GFR est AA >60 >60 ml/min/1.73m2    GFR est non-AA >60 >60 ml/min/1.73m2    Calcium 9.0 8.3 - 10.4 MG/DL    Bilirubin, total 0.4 0.2 - 1.1 MG/DL    ALT (SGPT) 15 12 - 65 U/L    AST (SGOT) 11 (L) 15 - 37 U/L    Alk. phosphatase 83 50 - 136 U/L    Protein, total 8.2 6.3 - 8.2 g/dL    Albumin 3.5 3.5 - 5.0 g/dL    Globulin 4.7 (H) 2.3 - 3.5 g/dL    A-G Ratio 0.7 (L) 1.2 - 3.5     PROTHROMBIN TIME + INR    Collection Time: 05/31/20  5:26 PM   Result Value Ref Range    Prothrombin time 13.4 12.0 - 14.7 sec    INR 1.0     PTT    Collection Time: 05/31/20  5:26 PM   Result Value Ref Range    aPTT 27.3 24.3 - 35.4 SEC   TROPONIN-HIGH SENSITIVITY    Collection Time: 05/31/20  5:26 PM   Result Value Ref Range    Troponin-High Sensitivity 58.1 (HH) 0 - 14 pg/mL     CXR Results  (Last 48 hours)    None        CT Results  (Last 48 hours)               05/31/20 1817  CT CHEST W CONT Final result    Impression:  IMPRESSION:       1. Saddle embolus of the pulmonary arteries, with right ventricular strain. [facility]   The critical results contained in this report were communicated to the emergency   room physician  by  Dr Allison Jose on 5/31/2020 6:40 PM. Critical results were   communicated as outlined in Section II.C.2.a.i of the ACR Practice Guideline for   Communication of Diagnostic Imaging Findings. CPT code(s) C2742097                       Narrative:  Clinical History: The patient is a 38years year old Female presenting with   symptoms of Shortness of breath, tachycardia, history of pulmonary embolism. Concern for PE       Technique: Thin section axial images were obtained through the chest with intravenous   contrast.  Coronal reformatted images were also provided for review. A total of 100 ml of Iopamidol ([id number]) 76 % contrast was administered   intravenously.        All CT scans at this facility are performed using dose reduction/dose modulation   techniques, as appropriate the performed exam, including the following:    Automated Exposure Control; Adjustment of the mA and/or kV according to patient   size (this includes techniques or standardized protocols for targeted exams   where dose is matched to indication/reason for exam); and Use of Iterative   Reconstruction Technique. Radiation Exposure Indices:   Reference Air Kerma (Ka,r) = 740 mGy-cm       Comparison:  None. FINDINGS:    Images through the lungs demonstrate no evidence of pulmonary nodule or mass. No   effusions are identified. A saddle embolus is demonstrated with moderate thrombus throughout predominantly   the descending branches of the pulmonary arteries. There is associated right   ventricular strain. There Is no significant hilar or mediastinal lymphadenopathy. Images chest wall structures as well as visualized portions of the upper abdomen   are unremarkable in appearance. There are no acute osseous abnormalities. Assessment and Plan: Active Hospital Problems    Diagnosis Date Noted    Acute saddle pulmonary embolism ([address] 05/31/2020     Active Problems:    Acute saddle pulmonary embolism (HCC) (5/31/2020)    IV heparin infusion; Consult IR - done in ER and they will see in am, keep pt NPO after MN  Cont O2 and pain control      · PLAN General  · DVT prophylaxis:  Heparin  · Code status: Full;  HCPOA:   · Risk: high  · Anticipated DC needs:  · Estimated LOS:  Greater than 2 midnights  · Plans discussed with patient and/or caregiver; questions answered. Parts of this document were created using dragon voice recognition software.  The chart has been reviewed but errors may still be present    Med records reviewed if applicable; findings:     Critical care time if applicable:      Signed By: Abhilash Patricio MD     May 31, 2020

## 2020-06-01 NOTE — PROGRESS NOTES
TRANSFER - IN REPORT:    Verbal report received from [place], RN(name) on [facility] Janene Richmond  being received from IR(unit) for routine post - op      Report consisted of patients Situation, Background, Assessment and   Recommendations(SBAR). Information from the following report(s) SBAR, Kardex, Procedure Summary and MAR was reviewed with the receiving nurse. Opportunity for questions and clarification was provided. Assessment completed upon patients arrival to unit and care assumed.

## 2020-06-01 NOTE — PROGRESS NOTES
Progress Note      Patient: Howard Max               Sex: female          MRN: [id number]           YOB: 1985      Age:  38 y.o. PCP:  Lam, MD Ramesh  Treatment Team: Attending Provider: Carlyn Murrell MD; Primary Nurse: Lucian Perez  Subjective:     New patient for me today. Denies any shortness of breath or chest pain currently. Complains of mild discomfort in her right calf. No fevers. Denies any nausea or vomiting or abdominal pain. Objective:   Physical Exam:   Visit Vitals  /78 (BP 1 Location: Left arm, BP Patient Position: At rest)   Pulse 95   Temp 98.4 °F (36.9 °C)   Resp 17   SpO2 98%      Temp (24hrs), Av.3 °F (36.8 °C), Min:97.7 °F (36.5 °C), Max:98.6 °F (37 °C)    Oxygen Therapy  O2 Sat (%): 98 % (20 0722)  No intake or output data in the 24 hours ending 20 1139     General: Conscious, No acute distress at rest.  Eyes:  ELGIN, No pallor/icterus    HENT:             Oral Mucosa is dry, No sinus tenderness  Neck:               Supple, No JVD  Lungs:  CTA Bilaterally, No significant wheeze/rhonchi  Heart:  S1 S2 regular  Abdomen: Soft, normal bowel sounds, NTND, No guarding/rigidity/rebound tend. Extremities: No pedal edema. Mild right calf tenderness.   Neurologic:  AAOX3, No acute FND  Skin:                No acute rashes  Musculoskeletal: No Acute findings  Psych:             Appropriate mood, Thought process seems to be normal.    LAB  Recent Results (from the past 24 hour(s))   CBC WITH AUTOMATED DIFF    Collection Time: 20  5:26 PM   Result Value Ref Range    WBC 15.5 (H) 4.3 - 11.1 K/uL    RBC 4.38 4.05 - 5.2 M/uL    HGB 12.2 11.7 - 15.4 g/dL    HCT 39.3 35.8 - 46.3 %    MCV 89.7 79.6 - 97.8 FL    MCH 27.9 26.1 - 32.9 PG    MCHC 31.0 (L) 31.4 - 35.0 g/dL    RDW 13.4 11.9 - 14.6 %    PLATELET 026 638 - 131 K/uL    MPV 10.1 9.4 - 12.3 FL    ABSOLUTE NRBC 0.00 0.0 - 0.2 K/uL    DF AUTOMATED NEUTROPHILS 89 (H) 43 - 78 %    LYMPHOCYTES 8 (L) 13 - 44 %    MONOCYTES 3 (L) 4.0 - 12.0 %    EOSINOPHILS 0 (L) 0.5 - 7.8 %    BASOPHILS 0 0.0 - 2.0 %    IMMATURE GRANULOCYTES 1 0.0 - 5.0 %    ABS. NEUTROPHILS 13.8 (H) 1.7 - 8.2 K/UL    ABS. LYMPHOCYTES 1.2 0.5 - 4.6 K/UL    ABS. MONOCYTES 0.5 0.1 - 1.3 K/UL    ABS. EOSINOPHILS 0.0 0.0 - 0.8 K/UL    ABS. BASOPHILS 0.0 0.0 - 0.2 K/UL    ABS. IMM. GRANS. 0.1 0.0 - 0.5 K/UL   METABOLIC PANEL, COMPREHENSIVE    Collection Time: 05/31/20  5:26 PM   Result Value Ref Range    Sodium 137 136 - 145 mmol/L    Potassium 4.8 3.5 - 5.1 mmol/L    Chloride 106 98 - 107 mmol/L    CO2 26 21 - 32 mmol/L    Anion gap 5 (L) 7 - 16 mmol/L    Glucose 159 (H) 65 - 100 mg/dL    BUN 9 6 - 23 MG/DL    Creatinine 0.90 0.6 - 1.0 MG/DL    GFR est AA >60 >60 ml/min/1.73m2    GFR est non-AA >60 >60 ml/min/1.73m2    Calcium 9.0 8.3 - 10.4 MG/DL    Bilirubin, total 0.4 0.2 - 1.1 MG/DL    ALT (SGPT) 15 12 - 65 U/L    AST (SGOT) 11 (L) 15 - 37 U/L    Alk.  phosphatase 83 50 - 136 U/L    Protein, total 8.2 6.3 - 8.2 g/dL    Albumin 3.5 3.5 - 5.0 g/dL    Globulin 4.7 (H) 2.3 - 3.5 g/dL    A-G Ratio 0.7 (L) 1.2 - 3.5     PROTHROMBIN TIME + INR    Collection Time: 05/31/20  5:26 PM   Result Value Ref Range    Prothrombin time 13.4 12.0 - 14.7 sec    INR 1.0     PTT    Collection Time: 05/31/20  5:26 PM   Result Value Ref Range    aPTT 27.3 24.3 - 35.4 SEC   TROPONIN-HIGH SENSITIVITY    Collection Time: 05/31/20  5:26 PM   Result Value Ref Range    Troponin-High Sensitivity 58.1 (HH) 0 - 14 pg/mL   EKG, 12 LEAD, INITIAL    Collection Time: 05/31/20  5:30 PM   Result Value Ref Range    Ventricular Rate 136 BPM    Atrial Rate 136 BPM    P-R Interval 136 ms    QRS Duration 70 ms    Q-T Interval 292 ms    QTC Calculation (Bezet) 439 ms    Calculated P Axis 68 degrees    Calculated R Axis 89 degrees    Calculated T Axis 36 degrees    Diagnosis       Sinus tachycardia  Nonspecific ST abnormality  Abnormal ECG  No previous ECGs available  Confirmed by YASSINE MENJIVAR (), Sarah Triplett ([id number]) on 6/1/2020 35:54:33 AM     METABOLIC PANEL, BASIC    Collection Time: 06/01/20  6:41 AM   Result Value Ref Range    Sodium 143 136 - 145 mmol/L    Potassium 4.4 3.5 - 5.1 mmol/L    Chloride 109 (H) 98 - 107 mmol/L    CO2 26 21 - 32 mmol/L    Anion gap 8 7 - 16 mmol/L    Glucose 107 (H) 65 - 100 mg/dL    BUN 10 6 - 23 MG/DL    Creatinine 0.82 0.6 - 1.0 MG/DL    GFR est AA >60 >60 ml/min/1.73m2    GFR est non-AA >60 >60 ml/min/1.73m2    Calcium 8.5 8.3 - 10.4 MG/DL   CBC WITH AUTOMATED DIFF    Collection Time: 06/01/20  6:41 AM   Result Value Ref Range    WBC 10.9 4.3 - 11.1 K/uL    RBC 4.23 4.05 - 5.2 M/uL    HGB 11.8 11.7 - 15.4 g/dL    HCT 37.5 35.8 - 46.3 %    MCV 88.7 79.6 - 97.8 FL    MCH 27.9 26.1 - 32.9 PG    MCHC 31.5 31.4 - 35.0 g/dL    RDW 13.5 11.9 - 14.6 %    PLATELET 996 516 - 248 K/uL    MPV 10.6 9.4 - 12.3 FL    ABSOLUTE NRBC 0.00 0.0 - 0.2 K/uL    DF AUTOMATED      NEUTROPHILS 73 43 - 78 %    LYMPHOCYTES 20 13 - 44 %    MONOCYTES 6 4.0 - 12.0 %    EOSINOPHILS 0 (L) 0.5 - 7.8 %    BASOPHILS 0 0.0 - 2.0 %    IMMATURE GRANULOCYTES 1 0.0 - 5.0 %    ABS. NEUTROPHILS 7.9 1.7 - 8.2 K/UL    ABS. LYMPHOCYTES 2.2 0.5 - 4.6 K/UL    ABS. MONOCYTES 0.7 0.1 - 1.3 K/UL    ABS. EOSINOPHILS 0.0 0.0 - 0.8 K/UL    ABS. BASOPHILS 0.0 0.0 - 0.2 K/UL    ABS. IMM. GRANS. 0.1 0.0 - 0.5 K/UL   PTT    Collection Time: 06/01/20  8:02 AM   Result Value Ref Range    aPTT 96.0 (H) 24.3 - 35.4 SEC       Ct Chest W Cont    Result Date: 5/31/2020  Clinical History: The patient is a 38years year old Female presenting with symptoms of Shortness of breath, tachycardia, history of pulmonary embolism. Concern for PE Technique: Thin section axial images were obtained through the chest with intravenous contrast.  Coronal reformatted images were also provided for review. A total of 100 ml of Iopamidol ([id number]) 76 % contrast was administered intravenously.  All CT scans at this facility are performed using dose reduction/dose modulation techniques, as appropriate the performed exam, including the following: Automated Exposure Control; Adjustment of the mA and/or kV according to patient size (this includes techniques or standardized protocols for targeted exams where dose is matched to indication/reason for exam); and Use of Iterative Reconstruction Technique. Radiation Exposure Indices: Reference Air Kerma (Ka,r) = 740 mGy-cm Comparison:  None. FINDINGS: Images through the lungs demonstrate no evidence of pulmonary nodule or mass. No effusions are identified. A saddle embolus is demonstrated with moderate thrombus throughout predominantly the descending branches of the pulmonary arteries. There is associated right ventricular strain. There Is no significant hilar or mediastinal lymphadenopathy. Images chest wall structures as well as visualized portions of the upper abdomen are unremarkable in appearance. There are no acute osseous abnormalities. IMPRESSION: 1. Saddle embolus of the pulmonary arteries, with right ventricular strain. [facility] The critical results contained in this report were communicated to the emergency room physician  by  Dr Masoud Zavala on 5/31/2020 6:40 PM. Critical results were communicated as outlined in Section II.C.2.a.i of the ACR Practice Guideline for Communication of Diagnostic Imaging Findings. CPT code(s) S5968612       Ct Chest W Cont    Result Date: 5/31/2020  IMPRESSION: 1. Saddle embolus of the pulmonary arteries, with right ventricular strain. [facility] The critical results contained in this report were communicated to the emergency room physician  by  Dr Masoud aZvala on 5/31/2020 6:40 PM. Critical results were communicated as outlined in Section II.C.2.a.i of the ACR Practice Guideline for Communication of Diagnostic Imaging Findings.  CPT code(s) U6700872       All Micro Results     None          Current Medications Reviewed      Assessment/Plan     Principal Problem:    Acute saddle pulmonary embolism ([facility] [address] (5/31/2020)        1. Acute saddle pulmonary embolus with right heart strain. Likely secondary to noncompliance with Xarelto. 2.  Morbid obesity  3. Leukocytosis: Reactive secondary to acute PE  4. Mild troponin elevation likely from PE    Currently on heparin drip. IR consulted for possible intervention. DVT Prophylaxis: Heparin drip  High risk patient secondary to acute saddle PE.     Andres Santana MD  June 1, 2020

## 2020-06-01 NOTE — PROGRESS NOTES
Care Management Interventions  PCP Verified by CM: Yes  Current Support Network: Other  Confirm Follow Up Transport: Friends  Freedom of Choice List was Provided with Basic Dialogue that Supports the Patient's Individualized Plan of Care/Goals, Treatment Preferences and Shares the Quality Data Associated with the Providers?: Yes  Discharge Location  Discharge Placement: Home   Self Pay  Patient recently moved him here from [place]. She lives with her girlfriend at her girlfriend's aunt house. CM will refer patient to [facility]. Patient can get her xarelto through this program.    Addendum  Patient did not meet the criteria for HOP so she was referred to the [facility].

## 2020-06-01 NOTE — PROGRESS NOTES
Patient transferred to interventional radiology with LR @ 100 ml/hr and heparin drip @ 18 units/kg/hr. On 2 L NC. No signs of distress.

## 2020-06-01 NOTE — PROGRESS NOTES
Patient arrived from IR. Heparin verified with RN. Patient awakens to voice, follows commands, Ox4. Breathing even and unlabored on 4L NC. NSR on monitor, S1/S2 auscultated. Bowel sounds active, abdomen semi-soft and intact. Skin intact,no breakdown to sacrum or heels; tattoos present. Lines: 22G L upper arm, 20G  Drips: heparin, tPA, NS      Patient currently denies any pain at this time. Will continue to monitor.

## 2020-06-01 NOTE — PROGRESS NOTES
Received report from [facility]. Patient awake resting in bed. Respirations present. On 2 L NC. No signs of distress. AxO x4. No needs expressed. Heparin noted to be at 18 units/kg/hr. Bed low and locked. Call light within reach. Will continue to monitor. This RN noted PTT has not been checked since 1726 yesterday. This RN placed a STAT order for PTT. This RN called lab and spoke to [facility] to request phlebotomist to draw lab.

## 2020-06-01 NOTE — PROGRESS NOTES
Dr. [facility] inquired about patient's echo. This RN called non-invasive to request when echo was done. [place] in non-invasive states echo was completed this AM.  I requested echo to be read STAT per Dr. [facility]. Dr. [facility] states patient may have surgery. Madison notified this reason for STAT read. [place] states she will try to find a cardiologist to read echo. Dr. RIVERA [facility] notified via [employer].

## 2020-06-01 NOTE — PROGRESS NOTES
3rd Venous Thrombo-Embolic Episode since August, 2019. Previous, Bi EKOS catheter infusion in August.  Small PE in March, 2020--no intervention. There is a good chance that at least some of the PE is chronic. Was taking Xeralto, but stopped several days ago. Discussed all treatment options. Will proceed with catheter-directed thrombolysis. Given the relatively small clot burden, will try a single Main PA catheter with low-dose tPA. Recommend Eliquis for treatment after thrombolysis.      Asya Muller MD

## 2020-06-02 ENCOUNTER — APPOINTMENT (OUTPATIENT)
Dept: INTERVENTIONAL RADIOLOGY/VASCULAR | Age: 35
DRG: 175 | End: 2020-06-02
Attending: RADIOLOGY
Payer: SUBSIDIZED

## 2020-06-02 ENCOUNTER — DOCUMENTATION ONLY (OUTPATIENT)
Dept: CASE MANAGEMENT | Age: 35
End: 2020-06-02

## 2020-06-02 LAB
ANION GAP SERPL CALC-SCNC: 3 MMOL/L (ref 7–16)
APTT PPP: 25.3 SEC (ref 24.3–35.4)
APTT PPP: <20 SEC (ref 24.3–35.4)
BASOPHILS # BLD: 0 K/UL (ref 0–0.2)
BASOPHILS # BLD: 0 K/UL (ref 0–0.2)
BASOPHILS NFR BLD: 0 % (ref 0–2)
BASOPHILS NFR BLD: 0 % (ref 0–2)
BUN SERPL-MCNC: 11 MG/DL (ref 6–23)
CALCIUM SERPL-MCNC: 7.9 MG/DL (ref 8.3–10.4)
CHLORIDE SERPL-SCNC: 110 MMOL/L (ref 98–107)
CO2 SERPL-SCNC: 30 MMOL/L (ref 21–32)
CREAT SERPL-MCNC: 0.65 MG/DL (ref 0.6–1)
DIFFERENTIAL METHOD BLD: ABNORMAL
DIFFERENTIAL METHOD BLD: ABNORMAL
EOSINOPHIL # BLD: 0 K/UL (ref 0–0.8)
EOSINOPHIL # BLD: 0 K/UL (ref 0–0.8)
EOSINOPHIL NFR BLD: 0 % (ref 0.5–7.8)
EOSINOPHIL NFR BLD: 0 % (ref 0.5–7.8)
ERYTHROCYTE [DISTWIDTH] IN BLOOD BY AUTOMATED COUNT: 13.3 % (ref 11.9–14.6)
ERYTHROCYTE [DISTWIDTH] IN BLOOD BY AUTOMATED COUNT: 13.4 % (ref 11.9–14.6)
FIBRINOGEN PPP-MCNC: 418 MG/DL (ref 190–501)
FIBRINOGEN PPP-MCNC: 449 MG/DL (ref 190–501)
GLUCOSE SERPL-MCNC: 95 MG/DL (ref 65–100)
HCT VFR BLD AUTO: 33.8 % (ref 35.8–46.3)
HCT VFR BLD AUTO: 35 % (ref 35.8–46.3)
HGB BLD-MCNC: 10.3 G/DL (ref 11.7–15.4)
HGB BLD-MCNC: 10.8 G/DL (ref 11.7–15.4)
IMM GRANULOCYTES # BLD AUTO: 0 K/UL (ref 0–0.5)
IMM GRANULOCYTES # BLD AUTO: 0 K/UL (ref 0–0.5)
IMM GRANULOCYTES NFR BLD AUTO: 0 % (ref 0–5)
IMM GRANULOCYTES NFR BLD AUTO: 0 % (ref 0–5)
INR PPP: 1.1
LYMPHOCYTES # BLD: 1.5 K/UL (ref 0.5–4.6)
LYMPHOCYTES # BLD: 1.8 K/UL (ref 0.5–4.6)
LYMPHOCYTES NFR BLD: 15 % (ref 13–44)
LYMPHOCYTES NFR BLD: 19 % (ref 13–44)
MCH RBC QN AUTO: 27.4 PG (ref 26.1–32.9)
MCH RBC QN AUTO: 27.7 PG (ref 26.1–32.9)
MCHC RBC AUTO-ENTMCNC: 30.5 G/DL (ref 31.4–35)
MCHC RBC AUTO-ENTMCNC: 30.9 G/DL (ref 31.4–35)
MCV RBC AUTO: 89.7 FL (ref 79.6–97.8)
MCV RBC AUTO: 89.9 FL (ref 79.6–97.8)
MONOCYTES # BLD: 0.6 K/UL (ref 0.1–1.3)
MONOCYTES # BLD: 0.6 K/UL (ref 0.1–1.3)
MONOCYTES NFR BLD: 6 % (ref 4–12)
MONOCYTES NFR BLD: 7 % (ref 4–12)
NEUTS SEG # BLD: 6.9 K/UL (ref 1.7–8.2)
NEUTS SEG # BLD: 7.9 K/UL (ref 1.7–8.2)
NEUTS SEG NFR BLD: 74 % (ref 43–78)
NEUTS SEG NFR BLD: 78 % (ref 43–78)
NRBC # BLD: 0 K/UL (ref 0–0.2)
NRBC # BLD: 0 K/UL (ref 0–0.2)
PLATELET # BLD AUTO: 190 K/UL (ref 150–450)
PLATELET # BLD AUTO: 222 K/UL (ref 150–450)
PMV BLD AUTO: 10.7 FL (ref 9.4–12.3)
PMV BLD AUTO: 11.2 FL (ref 9.4–12.3)
POTASSIUM SERPL-SCNC: 4.1 MMOL/L (ref 3.5–5.1)
PROTHROMBIN TIME: 14.8 SEC (ref 12–14.7)
RBC # BLD AUTO: 3.76 M/UL (ref 4.05–5.2)
RBC # BLD AUTO: 3.9 M/UL (ref 4.05–5.2)
SODIUM SERPL-SCNC: 143 MMOL/L (ref 136–145)
WBC # BLD AUTO: 10.2 K/UL (ref 4.3–11.1)
WBC # BLD AUTO: 9.3 K/UL (ref 4.3–11.1)

## 2020-06-02 PROCEDURE — 85610 PROTHROMBIN TIME: CPT

## 2020-06-02 PROCEDURE — 85384 FIBRINOGEN ACTIVITY: CPT

## 2020-06-02 PROCEDURE — 74011250637 HC RX REV CODE- 250/637: Performed by: HOSPITALIST

## 2020-06-02 PROCEDURE — 74011250636 HC RX REV CODE- 250/636: Performed by: RADIOLOGY

## 2020-06-02 PROCEDURE — 85025 COMPLETE CBC W/AUTO DIFF WBC: CPT

## 2020-06-02 PROCEDURE — 77030010507 HC ADH SKN DERMBND J&J -B

## 2020-06-02 PROCEDURE — B31U1ZZ FLUOROSCOPY OF PULMONARY TRUNK USING LOW OSMOLAR CONTRAST: ICD-10-PCS | Performed by: RADIOLOGY

## 2020-06-02 PROCEDURE — 99152 MOD SED SAME PHYS/QHP 5/>YRS: CPT

## 2020-06-02 PROCEDURE — 85730 THROMBOPLASTIN TIME PARTIAL: CPT

## 2020-06-02 PROCEDURE — 77010033678 HC OXYGEN DAILY

## 2020-06-02 PROCEDURE — 80048 BASIC METABOLIC PNL TOTAL CA: CPT

## 2020-06-02 PROCEDURE — 65270000029 HC RM PRIVATE

## 2020-06-02 PROCEDURE — 36415 COLL VENOUS BLD VENIPUNCTURE: CPT

## 2020-06-02 PROCEDURE — 74011000250 HC RX REV CODE- 250: Performed by: RADIOLOGY

## 2020-06-02 PROCEDURE — 74011636320 HC RX REV CODE- 636/320: Performed by: RADIOLOGY

## 2020-06-02 PROCEDURE — C1769 GUIDE WIRE: HCPCS

## 2020-06-02 RX ORDER — LIDOCAINE HYDROCHLORIDE 20 MG/ML
2-20 INJECTION, SOLUTION INFILTRATION; PERINEURAL ONCE
Status: COMPLETED | OUTPATIENT
Start: 2020-06-02 | End: 2020-06-02

## 2020-06-02 RX ORDER — SODIUM CHLORIDE 9 MG/ML
125 INJECTION, SOLUTION INTRAVENOUS CONTINUOUS
Status: DISCONTINUED | OUTPATIENT
Start: 2020-06-02 | End: 2020-06-03

## 2020-06-02 RX ORDER — LANOLIN ALCOHOL/MO/W.PET/CERES
1 CREAM (GRAM) TOPICAL 2 TIMES DAILY WITH MEALS
Status: DISCONTINUED | OUTPATIENT
Start: 2020-06-02 | End: 2020-06-03 | Stop reason: HOSPADM

## 2020-06-02 RX ORDER — FENTANYL CITRATE 50 UG/ML
25-50 INJECTION, SOLUTION INTRAMUSCULAR; INTRAVENOUS
Status: DISCONTINUED | OUTPATIENT
Start: 2020-06-02 | End: 2020-06-02

## 2020-06-02 RX ORDER — MIDAZOLAM HYDROCHLORIDE 1 MG/ML
.5-2 INJECTION, SOLUTION INTRAMUSCULAR; INTRAVENOUS
Status: DISCONTINUED | OUTPATIENT
Start: 2020-06-02 | End: 2020-06-02

## 2020-06-02 RX ORDER — DIPHENHYDRAMINE HYDROCHLORIDE 50 MG/ML
12.5-5 INJECTION, SOLUTION INTRAMUSCULAR; INTRAVENOUS ONCE
Status: COMPLETED | OUTPATIENT
Start: 2020-06-02 | End: 2020-06-02

## 2020-06-02 RX ORDER — HEPARIN SODIUM 5000 [USP'U]/100ML
18-36 INJECTION, SOLUTION INTRAVENOUS
Status: DISCONTINUED | OUTPATIENT
Start: 2020-06-02 | End: 2020-06-03

## 2020-06-02 RX ORDER — SODIUM CHLORIDE 9 MG/ML
25 INJECTION, SOLUTION INTRAVENOUS CONTINUOUS
Status: DISCONTINUED | OUTPATIENT
Start: 2020-06-02 | End: 2020-06-02

## 2020-06-02 RX ADMIN — LIDOCAINE HYDROCHLORIDE 120 MG: 20 INJECTION, SOLUTION INFILTRATION; PERINEURAL at 15:15

## 2020-06-02 RX ADMIN — Medication 10 ML: at 06:00

## 2020-06-02 RX ADMIN — DIPHENHYDRAMINE HYDROCHLORIDE 50 MG: 50 INJECTION, SOLUTION INTRAMUSCULAR; INTRAVENOUS at 13:12

## 2020-06-02 RX ADMIN — Medication 10 ML: at 14:00

## 2020-06-02 RX ADMIN — HEPARIN SODIUM 300 UNITS/HR: 5000 INJECTION, SOLUTION INTRAVENOUS at 10:44

## 2020-06-02 RX ADMIN — SODIUM CHLORIDE 125 ML/HR: 9 INJECTION, SOLUTION INTRAVENOUS at 19:31

## 2020-06-02 RX ADMIN — FENTANYL CITRATE 50 MCG: 50 INJECTION INTRAMUSCULAR; INTRAVENOUS at 15:15

## 2020-06-02 RX ADMIN — FERROUS SULFATE TAB 325 MG (65 MG ELEMENTAL FE) 325 MG: 325 (65 FE) TAB at 17:53

## 2020-06-02 RX ADMIN — IOPAMIDOL 45 ML: 612 INJECTION, SOLUTION INTRAVENOUS at 15:28

## 2020-06-02 RX ADMIN — MIDAZOLAM 1 MG: 1 INJECTION INTRAMUSCULAR; INTRAVENOUS at 15:15

## 2020-06-02 RX ADMIN — SODIUM CHLORIDE 25 ML/HR: 900 INJECTION, SOLUTION INTRAVENOUS at 15:00

## 2020-06-02 NOTE — PROGRESS NOTES
Chart reviewed s/p tx to CCU post IR interventions. SWCM screen and gave information for [facility], as pt does not meet criteria for HOP. CM will continue to follow for any further d/c needs/POC. Care Management Interventions  PCP Verified by CM: Yes  Current Support Network:  Other  Confirm Follow Up Transport: Friends  Freedom of Choice List was Provided with Basic Dialogue that Supports the Patient's Individualized Plan of Care/Goals, Treatment Preferences and Shares the Quality Data Associated with the Providers?: Yes  Discharge Location  Discharge Placement: Home

## 2020-06-02 NOTE — PROGRESS NOTES
Initial visit made to patient and a prayer was provided. A  card was left. She appreciated the prayer. She stated that she had been in the hospital before with the same type of problem.         CAT Lucas

## 2020-06-02 NOTE — PROGRESS NOTES
Interdisciplinary team rounds were held 6/2/2020 with the following team members:Care Management, Nursing, Nurse Practitioner, Nutrition, Occupational Therapy, Pastoral Care, Pharmacy, Physical Therapy, Physician and Respiratory Therapy and the patient. Plan of care discussed. See clinical pathway and/or care plan for interventions and desired outcomes.

## 2020-06-02 NOTE — PROGRESS NOTES
Wellness Outreach/HCI team accepted referral from [place] [facility], Case Management. WO/HCI contacted number [phone] box full-not accepting messages and [phone] - Voicemail not set up.

## 2020-06-02 NOTE — PROGRESS NOTES
Bedside and Verbal shift change report given to Trenton Szymanski RN (oncoming nurse) by Fidel Urena RN (offgoing nurse). Report included the following information SBAR, Kardex, ED Summary, MAR, Accordion, Recent Results and Alarm Parameters . See MAR for dual IV med sign off.

## 2020-06-02 NOTE — PROGRESS NOTES
Progress Note      Patient: Dieudonne Salinas               Sex: female          MRN: [id number]           YOB: 1985      Age:  38 y.o. PCP:  Lam, MD Ramesh  Treatment Team: Attending Provider: Chantel Urrutia MD; Utilization Review: Deng Barker RN; Care Manager: Aliyah Diaz RN  Subjective:     Feeling better this morning. Denies any shortness of breath. No evidence of bleeding. No fevers overnight. Denies any chest pain or abdominal pain. Objective:   Physical Exam:   Visit Vitals  /85   Pulse 98   Temp 98.3 °F (36.8 °C)   Resp (!) 31   Ht 5' 4\" (1.626 m)   Wt 108.9 kg (240 lb)   SpO2 98%   BMI 41.20 kg/m²      Temp (24hrs), Av.5 °F (36.9 °C), Min:98 °F (36.7 °C), Max:99 °F (37.2 °C)    Oxygen Therapy  O2 Sat (%): 98 % (20 1135)  Pulse via Oximetry: 96 beats per minute (20 1135)  O2 Device: Room air (20 1135)  O2 Flow Rate (L/min): 2 l/min (20 1100)  ETCO2 (mmHg): 23 mmHg (20 1659)    Intake/Output Summary (Last 24 hours) at 2020 1148  Last data filed at 2020 1135  Gross per 24 hour   Intake 2876 ml   Output 850 ml   Net 2026 ml        General: Conscious, No acute distress at rest.  Eyes:  ELGIN, No pallor/icterus    HENT:             Oral Mucosa is moist, No sinus tenderness  Neck:               Supple, No JVD. Lungs:  CTA Bilaterally, No significant wheeze/rhonchi  Heart:  S1 S2 regular  Abdomen: Soft, normal bowel sounds, NTND, No guarding/rigidity/rebound tend. Extremities: No pedal edema. No acute findings.     Neurologic:  AAOX3, No acute FND  Skin:                No acute rashes  Musculoskeletal: No Acute findings  Psych:             Appropriate mood, Thought process seems to be normal.    LAB  Recent Results (from the past 24 hour(s))   CBC WITH AUTOMATED DIFF    Collection Time: 20 10:26 PM   Result Value Ref Range    WBC 10.2 4.3 - 11.1 K/uL    RBC 4.15 4.05 - 5.2 M/uL    HGB 11.3 (L) 11.7 - 15.4 g/dL    HCT 37.3 35.8 - 46.3 %    MCV 89.9 79.6 - 97.8 FL    MCH 27.2 26.1 - 32.9 PG    MCHC 30.3 (L) 31.4 - 35.0 g/dL    RDW 13.5 11.9 - 14.6 %    PLATELET 757 937 - 880 K/uL    MPV 10.6 9.4 - 12.3 FL    ABSOLUTE NRBC 0.00 0.0 - 0.2 K/uL    DF AUTOMATED      NEUTROPHILS 77 43 - 78 %    LYMPHOCYTES 18 13 - 44 %    MONOCYTES 5 4.0 - 12.0 %    EOSINOPHILS 0 (L) 0.5 - 7.8 %    BASOPHILS 0 0.0 - 2.0 %    IMMATURE GRANULOCYTES 0 0.0 - 5.0 %    ABS. NEUTROPHILS 7.8 1.7 - 8.2 K/UL    ABS. LYMPHOCYTES 1.8 0.5 - 4.6 K/UL    ABS. MONOCYTES 0.5 0.1 - 1.3 K/UL    ABS. EOSINOPHILS 0.0 0.0 - 0.8 K/UL    ABS. BASOPHILS 0.0 0.0 - 0.2 K/UL    ABS. IMM. GRANS. 0.0 0.0 - 0.5 K/UL   PTT    Collection Time: 06/01/20 10:26 PM   Result Value Ref Range    aPTT 30.9 24.3 - 35.4 SEC   FIBRINOGEN    Collection Time: 06/01/20 10:26 PM   Result Value Ref Range    Fibrinogen 448 190 - 501 mg/dL   CBC WITH AUTOMATED DIFF    Collection Time: 06/02/20  4:00 AM   Result Value Ref Range    WBC 10.2 4.3 - 11.1 K/uL    RBC 3.90 (L) 4.05 - 5.2 M/uL    HGB 10.8 (L) 11.7 - 15.4 g/dL    HCT 35.0 (L) 35.8 - 46.3 %    MCV 89.7 79.6 - 97.8 FL    MCH 27.7 26.1 - 32.9 PG    MCHC 30.9 (L) 31.4 - 35.0 g/dL    RDW 13.4 11.9 - 14.6 %    PLATELET 116 829 - 290 K/uL    MPV 11.2 9.4 - 12.3 FL    ABSOLUTE NRBC 0.00 0.0 - 0.2 K/uL    DF AUTOMATED      NEUTROPHILS 78 43 - 78 %    LYMPHOCYTES 15 13 - 44 %    MONOCYTES 6 4.0 - 12.0 %    EOSINOPHILS 0 (L) 0.5 - 7.8 %    BASOPHILS 0 0.0 - 2.0 %    IMMATURE GRANULOCYTES 0 0.0 - 5.0 %    ABS. NEUTROPHILS 7.9 1.7 - 8.2 K/UL    ABS. LYMPHOCYTES 1.5 0.5 - 4.6 K/UL    ABS. MONOCYTES 0.6 0.1 - 1.3 K/UL    ABS. EOSINOPHILS 0.0 0.0 - 0.8 K/UL    ABS. BASOPHILS 0.0 0.0 - 0.2 K/UL    ABS. IMM.  GRANS. 0.0 0.0 - 0.5 K/UL   PTT    Collection Time: 06/02/20  4:00 AM   Result Value Ref Range    aPTT <20.0 (L) 24.3 - 35.4 SEC   PROTHROMBIN TIME + INR    Collection Time: 06/02/20  4:00 AM   Result Value Ref Range    Prothrombin time 14.8 (H) 12.0 - 14.7 sec    INR 1.1     FIBRINOGEN    Collection Time: 06/02/20  4:00 AM   Result Value Ref Range    Fibrinogen 449 190 - 913 mg/dL   METABOLIC PANEL, BASIC    Collection Time: 06/02/20  4:00 AM   Result Value Ref Range    Sodium 143 136 - 145 mmol/L    Potassium 4.1 3.5 - 5.1 mmol/L    Chloride 110 (H) 98 - 107 mmol/L    CO2 30 21 - 32 mmol/L    Anion gap 3 (L) 7 - 16 mmol/L    Glucose 95 65 - 100 mg/dL    BUN 11 6 - 23 MG/DL    Creatinine 0.65 0.6 - 1.0 MG/DL    GFR est AA >60 >60 ml/min/1.73m2    GFR est non-AA >60 >60 ml/min/1.73m2    Calcium 7.9 (L) 8.3 - 10.4 MG/DL   CBC WITH AUTOMATED DIFF    Collection Time: 06/02/20  9:52 AM   Result Value Ref Range    WBC 9.3 4.3 - 11.1 K/uL    RBC 3.76 (L) 4.05 - 5.2 M/uL    HGB 10.3 (L) 11.7 - 15.4 g/dL    HCT 33.8 (L) 35.8 - 46.3 %    MCV 89.9 79.6 - 97.8 FL    MCH 27.4 26.1 - 32.9 PG    MCHC 30.5 (L) 31.4 - 35.0 g/dL    RDW 13.3 11.9 - 14.6 %    PLATELET 615 286 - 476 K/uL    MPV 10.7 9.4 - 12.3 FL    ABSOLUTE NRBC 0.00 0.0 - 0.2 K/uL    DF AUTOMATED      NEUTROPHILS 74 43 - 78 %    LYMPHOCYTES 19 13 - 44 %    MONOCYTES 7 4.0 - 12.0 %    EOSINOPHILS 0 (L) 0.5 - 7.8 %    BASOPHILS 0 0.0 - 2.0 %    IMMATURE GRANULOCYTES 0 0.0 - 5.0 %    ABS. NEUTROPHILS 6.9 1.7 - 8.2 K/UL    ABS. LYMPHOCYTES 1.8 0.5 - 4.6 K/UL    ABS. MONOCYTES 0.6 0.1 - 1.3 K/UL    ABS. EOSINOPHILS 0.0 0.0 - 0.8 K/UL    ABS. BASOPHILS 0.0 0.0 - 0.2 K/UL    ABS. IMM. GRANS. 0.0 0.0 - 0.5 K/UL   PTT    Collection Time: 06/02/20  9:52 AM   Result Value Ref Range    aPTT 25.3 24.3 - 35.4 SEC   FIBRINOGEN    Collection Time: 06/02/20  9:52 AM   Result Value Ref Range    Fibrinogen 418 190 - 501 mg/dL       Ir Thrombolysis Transcath Non Coronary Or Intracranial    Result Date: 6/1/2020  PROCEDURE: Pulmonary angiography and interventions Procedural Personnel Attending physician(s): Ely Bianchi M.D.  Pre-procedure diagnosis:  Very pleasant 54-year-old woman with bilateral lower and upper lobe pulmonary emboli as well as a slender saddle embolus. 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 The patient was taking Xarelto, but discontinued taking Xarelto several days ago. Post-procedure diagnosis:  Same Indication:  Dyspnea, hypoxia, pulmonary embolism Additional clinical history:  None Complications:  No immediate complications. IMPRESSION:  Angiography of the main pulmonary artery demonstrates the catheter to be in good position. Uncomplicated initiation of tPA thrombolysis. Plan:  The patient will be transferred to the intensive care unit for close monitoring, frequent blood work, and continuous tPA infusion. The patient will return to the IR department in about 24 hours for follow-up imaging. _______________________________________________________________ PROCEDURE SUMMARY: - Venous access with ultrasound guidance - Main pulmonary angiography - Superselective pulmonary angiography: Not performed - Pulmonary arterial interventions as described below - Additional procedure(s): None PROCEDURE DETAILS: Pre-procedure Consent:  Informed written and oral consent for the procedure was obtained after explanation of risks (including, but not limitted to:  hemorrhage, vascular injury, infection) benefits, and alternatives. The patient's questions were answered to his/her satisfaction. He/She stated understanding and requested that we proceed. Final verification:  A time-out identifying the patient and planned procedure was performed prior to this procedure. Preparation (MIPS):  Maximal sterile barrier technique (including:  Sterile Ultrasound probe cover, Sterile Ultrasound gel, cap, mask, Sterile Gown, Sterile Gloves, Sterile Drape, hand hygiene, and 2% chlorhexidine cutaneous antisepsis) was used. Anesthesia/sedation Level of anesthesia/sedation: Moderate sedation (conscious sedation) Anesthesia/sedation administered by:  Independent trained observer under attending supervision with continuous monitoring of the patient?s level of consciousness and physiologic status Total intra-service sedation time (minutes): 16 Access Local anesthesia was administered. The vessel was sonographically evaluated and determined to be patent. Real time ultrasound was used to visualize needle entry into the vessel and a permanent image was stored. A 8-British, 23 cm sheath was placed. Vein accessed:  Right internal jugular vein Access technique: Micropuncture set with 21 gauge needle Pulmonary angiography and interventions The pulmonary arterial system was catheterized using a 6-British angled pigtail diagnostic catheter. Indication for angiography: Diagnostic angiography - There was no prior catheter-based angiographic study available and a full diagnostic study was performed. The decision to intervene was based on the diagnostic study. Vessel catheterized:  Main pulmonary artery Findings:  Large diameter main pulmonary artery and proximal right and left pulmonary arteries. The catheter tip is in the appropriate position in the distal main pulmonary artery. From this location, tPA thrombolysis will be initiated. Closure The catheter and sheath were left in place. A sterile dressing was applied. Contrast Contrast agent:  Isovue-300 Contrast volume (mL):  12 Radiation Dose Reference Air Kerma (Diana Show):  168 mGy Dose Area Product/Kerma Area Product (DAP/ASTON/PKA): 2710cGy-cm2 Fluoroscopy Exposure Time:  1.9 minutes     Fluorographic Images:  1 pulmonary arteriogram Additional Details Additional description of procedure:  None Equipment details:  None Specimens removed:  None Estimated blood loss (mL):  Less than 10 Standardized report: [email or web address] Attestation Signer name: Promise Avendano M.D. I attest that I performed the entire procedure. I reviewed the stored images and agree with the report as written.        Ir Thrombolysis Transcath Non Coronary Or Intracranial    Result Date: 6/1/2020  IMPRESSION:  Angiography of the main pulmonary artery demonstrates the catheter to be in good position. Uncomplicated initiation of tPA thrombolysis. Plan:  The patient will be transferred to the intensive care unit for close monitoring, frequent blood work, and continuous tPA infusion. The patient will return to the IR department in about 24 hours for follow-up imaging. _______________________________________________________________ PROCEDURE SUMMARY: - Venous access with ultrasound guidance - Main pulmonary angiography - Superselective pulmonary angiography: Not performed - Pulmonary arterial interventions as described below - Additional procedure(s): None PROCEDURE DETAILS: Pre-procedure Consent:  Informed written and oral consent for the procedure was obtained after explanation of risks (including, but not limitted to:  hemorrhage, vascular injury, infection) benefits, and alternatives. The patient's questions were answered to his/her satisfaction. He/She stated understanding and requested that we proceed. Final verification:  A time-out identifying the patient and planned procedure was performed prior to this procedure. Preparation (MIPS):  Maximal sterile barrier technique (including:  Sterile Ultrasound probe cover, Sterile Ultrasound gel, cap, mask, Sterile Gown, Sterile Gloves, Sterile Drape, hand hygiene, and 2% chlorhexidine cutaneous antisepsis) was used. Anesthesia/sedation Level of anesthesia/sedation: Moderate sedation (conscious sedation) Anesthesia/sedation administered by: Independent trained observer under attending supervision with continuous monitoring of the patient?s level of consciousness and physiologic status Total intra-service sedation time (minutes): 16 Access Local anesthesia was administered. The vessel was sonographically evaluated and determined to be patent.  Real time ultrasound was used to visualize needle entry into the vessel and a permanent image was stored. A 8-British [place], 23 cm sheath was placed. Vein accessed:  Right internal jugular vein Access technique: Micropuncture set with 21 gauge needle Pulmonary angiography and interventions The pulmonary arterial system was catheterized using a 6-British [place] angled pigtail diagnostic catheter. Indication for angiography: Diagnostic angiography - There was no prior catheter-based angiographic study available and a full diagnostic study was performed. The decision to intervene was based on the diagnostic study. Vessel catheterized:  Main pulmonary artery Findings:  Large diameter main pulmonary artery and proximal right and left pulmonary arteries. The catheter tip is in the appropriate position in the distal main pulmonary artery. From this location, tPA thrombolysis will be initiated. Closure The catheter and sheath were left in place. A sterile dressing was applied. Contrast Contrast agent:  Isovue-300 Contrast volume (mL):  12 Radiation Dose Reference Air Kerma ([place]):  168 mGy Dose Area Product/Kerma Area Product (DAP/ASTON/PKA): 2710cGy-cm2 Fluoroscopy Exposure Time:  1.9 minutes     Fluorographic Images:  1 pulmonary arteriogram Additional Details Additional description of procedure:  None Equipment details:  None Specimens removed:  None Estimated blood loss (mL):  Less than 10 Standardized report: [email or web address] Attestation Signer name: Ely Bianchi M.D. I attest that I performed the entire procedure. I reviewed the stored images and agree with the report as written. All Micro Results     None          Current Medications Reviewed      Assessment/Plan     Principal Problem:    Acute saddle pulmonary embolism ([address] (5/31/2020)        1. Acute saddle pulmonary embolus with right heart strain. Likely secondary to noncompliance with Xarelto. 2.  Morbid obesity  3. Leukocytosis: Reactive secondary to acute PE. Resolved  4.   Mild troponin elevation likely from PE  5.  Anemia likely from iron deficiency. Place her on iron supplementation. Seen by IR in consultation, patient had pulmonary arteriogram with catheter placement and started on thrombolysis on 6/2/2020. TPA probably be stopped this afternoon and she will be transferred to medical floor after that. Continue heparin drip even after stopping alteplase. May switch her to Eliquis tomorrow. DVT Prophylaxis: Heparin drip  High risk patient secondary to acute saddle PE.     Landon Hunt MD  June 2, 2020

## 2020-06-02 NOTE — PROCEDURES
Department of Interventional Radiology  [phone]        Interventional Radiology Brief Procedure Note    Patient: Shanelle Sorto MRN: [id number]  SSN: [id number]    YOB: 1985  Age: 38 y.o. Sex: female      Date of Procedure: 6/2/2020    Pre-Procedure Diagnosis: Bi PE. Post-Procedure Diagnosis: SAME    Procedure(s): Pulmonary Arteriogram.     Brief Description of Procedure: as above    Performed By: Dunia Henry MD     Assistants: None    Anesthesia:Moderate Sedation    Estimated Blood Loss: None    Specimens:  None    Implants:  None    Findings: Saddle PE is no longer visible. Complications: None    Recommendations: 1 hour bedrest w HOB elevated. OK to resume Heparin at 1800. Agree w Eliquis for long term [address] [place] [address]. Consider outpatient Hematology consult in this patient w 3 episodes of unprovoked VTE    Follow Up: PRN.       Signed By: Dunia Henry MD     June 2, 2020

## 2020-06-02 NOTE — PROGRESS NOTES
LEAPFROG PROTOCOL NOTE    Fidel Smith Comp  6/2/2020    The patient is currently in the critical care setting managed by Dr. Daisy Mae with saddle PTE post catheter mediated thrombolysis by IR. The patient's chart is reviewed and the patient is discussed with the staff. Patient is currently hemodynamically stable. Patient has no needs identified for Intensivist management in the critical care setting at this time. Please notify us if can be of assistance. No charge billed to the patient. Thank you.     Elizabeth Mcghee MD

## 2020-06-02 NOTE — PROGRESS NOTES
Dr. Edinson Boland notified of pt's BP= 129/92. No new Orders for PRN BP meds for diastolic. Orders to continue to monitor.

## 2020-06-02 NOTE — PROGRESS NOTES
TRANSFER - OUT REPORT:    Verbal report given to NABIL Hightower on Jennifer Boyer [facility] Courser  being transferred to CCU for routine post - op       Report consisted of patients Situation, Background, Assessment and   Recommendations(SBAR). Information from the following report(s) SBAR, Kardex, Procedure Summary and MAR was reviewed with the receiving nurse. Lines:   Peripheral IV 05/31/20 Anterior;Proximal;Right Forearm (Active)   Site Assessment Clean, dry, & intact 6/2/2020 11:00 AM   Phlebitis Assessment 0 6/2/2020 11:00 AM   Infiltration Assessment 0 6/2/2020 11:00 AM   Dressing Status Clean, dry, & intact 6/2/2020 11:00 AM   Dressing Type Transparent;Tape 6/2/2020  7:00 AM   Hub Color/Line Status Patent; Flushed 6/2/2020 11:00 AM   Alcohol Cap Used No 6/2/2020 11:00 AM       Peripheral IV 05/31/20 Left;Proximal;Upper (Active)   Site Assessment Clean, dry, & intact 6/2/2020 11:00 AM   Phlebitis Assessment 0 6/2/2020 11:00 AM   Infiltration Assessment 0 6/2/2020 11:00 AM   Dressing Status Clean, dry, & intact 6/2/2020 11:00 AM   Dressing Type Transparent;Tape 6/2/2020  7:00 AM   Hub Color/Line Status Patent; Flushed 6/2/2020 11:00 AM   Alcohol Cap Used No 6/2/2020 11:00 AM        Opportunity for questions and clarification was provided. Restart Heparin drip at 18:00 today.

## 2020-06-02 NOTE — PROGRESS NOTES
TRANSFER - IN REPORT:    Verbal report received from [facility] (name) on [facility]  being received from room 3303(unit) for routine progression of care      Report consisted of patients Situation, Background, Assessment and   Recommendations(SBAR). Information from the following report(s) SBAR, Kardex, Intake/Output, MAR and Recent Results was reviewed with the receiving nurse. Opportunity for questions and clarification was provided. Assessment completed upon patients arrival to unit and care assumed.

## 2020-06-03 VITALS
HEART RATE: 115 BPM | TEMPERATURE: 98.9 F | RESPIRATION RATE: 16 BRPM | WEIGHT: 240 LBS | HEIGHT: 64 IN | OXYGEN SATURATION: 92 % | BODY MASS INDEX: 40.97 KG/M2 | SYSTOLIC BLOOD PRESSURE: 128 MMHG | DIASTOLIC BLOOD PRESSURE: 78 MMHG

## 2020-06-03 LAB
APPEARANCE UR: ABNORMAL
APTT PPP: 100.7 SEC (ref 24.3–35.4)
APTT PPP: 155.7 SEC (ref 24.3–35.4)
BASOPHILS # BLD: 0 K/UL (ref 0–0.2)
BASOPHILS NFR BLD: 0 % (ref 0–2)
BILIRUB UR QL: NEGATIVE
COLOR UR: YELLOW
DIFFERENTIAL METHOD BLD: ABNORMAL
EOSINOPHIL # BLD: 0.1 K/UL (ref 0–0.8)
EOSINOPHIL NFR BLD: 1 % (ref 0.5–7.8)
ERYTHROCYTE [DISTWIDTH] IN BLOOD BY AUTOMATED COUNT: 13.2 % (ref 11.9–14.6)
GLUCOSE BLD STRIP.AUTO-MCNC: 106 MG/DL (ref 65–100)
GLUCOSE UR STRIP.AUTO-MCNC: NEGATIVE MG/DL
HCG UR QL: NEGATIVE
HCT VFR BLD AUTO: 35.5 % (ref 35.8–46.3)
HGB BLD-MCNC: 11.1 G/DL (ref 11.7–15.4)
HGB UR QL STRIP: NEGATIVE
IMM GRANULOCYTES # BLD AUTO: 0.1 K/UL (ref 0–0.5)
IMM GRANULOCYTES NFR BLD AUTO: 1 % (ref 0–5)
KETONES UR QL STRIP.AUTO: NEGATIVE MG/DL
LEUKOCYTE ESTERASE UR QL STRIP.AUTO: NEGATIVE
LYMPHOCYTES # BLD: 2.3 K/UL (ref 0.5–4.6)
LYMPHOCYTES NFR BLD: 21 % (ref 13–44)
MCH RBC QN AUTO: 27.8 PG (ref 26.1–32.9)
MCHC RBC AUTO-ENTMCNC: 31.3 G/DL (ref 31.4–35)
MCV RBC AUTO: 89 FL (ref 79.6–97.8)
MONOCYTES # BLD: 0.5 K/UL (ref 0.1–1.3)
MONOCYTES NFR BLD: 4 % (ref 4–12)
NEUTS SEG # BLD: 7.7 K/UL (ref 1.7–8.2)
NEUTS SEG NFR BLD: 73 % (ref 43–78)
NITRITE UR QL STRIP.AUTO: NEGATIVE
NRBC # BLD: 0 K/UL (ref 0–0.2)
PH UR STRIP: 7 [PH] (ref 5–9)
PLATELET # BLD AUTO: 200 K/UL (ref 150–450)
PMV BLD AUTO: 10.9 FL (ref 9.4–12.3)
PROT UR STRIP-MCNC: NEGATIVE MG/DL
RBC # BLD AUTO: 3.99 M/UL (ref 4.05–5.2)
SP GR UR REFRACTOMETRY: 1.03 (ref 1–1.02)
UROBILINOGEN UR QL STRIP.AUTO: 1 EU/DL (ref 0.2–1)
WBC # BLD AUTO: 10.6 K/UL (ref 4.3–11.1)

## 2020-06-03 PROCEDURE — 82962 GLUCOSE BLOOD TEST: CPT

## 2020-06-03 PROCEDURE — 85025 COMPLETE CBC W/AUTO DIFF WBC: CPT

## 2020-06-03 PROCEDURE — 74011250637 HC RX REV CODE- 250/637: Performed by: HOSPITALIST

## 2020-06-03 PROCEDURE — 85730 THROMBOPLASTIN TIME PARTIAL: CPT

## 2020-06-03 PROCEDURE — 81025 URINE PREGNANCY TEST: CPT

## 2020-06-03 PROCEDURE — 74011250636 HC RX REV CODE- 250/636: Performed by: RADIOLOGY

## 2020-06-03 PROCEDURE — 36415 COLL VENOUS BLD VENIPUNCTURE: CPT

## 2020-06-03 PROCEDURE — 81003 URINALYSIS AUTO W/O SCOPE: CPT

## 2020-06-03 RX ORDER — LANOLIN ALCOHOL/MO/W.PET/CERES
325 CREAM (GRAM) TOPICAL 2 TIMES DAILY WITH MEALS
Qty: 60 TAB | Refills: 0 | Status: SHIPPED | OUTPATIENT
Start: 2020-06-03 | End: 2020-07-03

## 2020-06-03 RX ADMIN — APIXABAN 10 MG: 5 TABLET, FILM COATED ORAL at 07:56

## 2020-06-03 RX ADMIN — HEPARIN SODIUM AND DEXTROSE 18 UNITS/KG/HR: 5000; 5 INJECTION INTRAVENOUS at 05:41

## 2020-06-03 RX ADMIN — FERROUS SULFATE TAB 325 MG (65 MG ELEMENTAL FE) 325 MG: 325 (65 FE) TAB at 07:56

## 2020-06-03 RX ADMIN — SODIUM CHLORIDE 125 ML/HR: 9 INJECTION, SOLUTION INTRAVENOUS at 04:14

## 2020-06-03 RX ADMIN — HEPARIN SODIUM AND DEXTROSE 18 UNITS/KG/HR: 5000; 5 INJECTION INTRAVENOUS at 06:58

## 2020-06-03 NOTE — PROGRESS NOTES
Patient arrived to room from 3rd floor via wheelchair. Heparin rate verified with Santino Perry RN. Pt oriented to room. Denies needs and pain this time. Call light in reach. Safety measures provided. Will continue to monitor.

## 2020-06-03 NOTE — PROGRESS NOTES
MSN, CM:  Patient to be discharged home today with no services ordered or requested. Patient was given an Eliquis voucher for 30 day supply. Patient agrees with this discharge plan. Girlfriend to transport patient home. Patient has met all milestones for this admission. Care Management Interventions  PCP Verified by CM: Yes  Current Support Network:  Other  Confirm Follow Up Transport: Friends  Freedom of Choice List was Provided with Basic Dialogue that Supports the Patient's Individualized Plan of Care/Goals, Treatment Preferences and Shares the Quality Data Associated with the Providers?: Yes  Discharge Location  Discharge Placement: Home

## 2020-06-03 NOTE — DISCHARGE SUMMARY
Hospitalist Discharge Summary     Patient ID:  Mahnaz Moreno  [id number]  14 y.o.  1985  Admit date: 5/31/2020  Discharge date: Attending: Janelle Altman MD  PCP:  Lam, MD Ramesh    Admission Diagnosis: Acute saddle pulmonary embolism [facility])    Discharge Diagnosis: Acute saddle pulmonary embolism ([facility] [address]   Principal Problem:    Acute saddle pulmonary embolism ([facility] [address] (5/31/2020)         Hospital Course:  Please refer to the admission H&P for details of presentation. In summary, this is a 69-year-old lady with history of multiple PEs in the past, admitted to the hospital for acute saddle pulmonary embolus with right heart strain. She was also found to have right lower extremity DVT. Patient was placed on heparin drip and IR was consulted. Patient had pulmonary arteriogram with catheter placement and TPA was given for 24 hours. Repeat arteriogram did not show any saddle embolus as per radiology. Patient was continued on heparin drip after that and she was switched over to Eliquis today. Compression stockings were given to prevent post thrombotic syndrome in both lower extremities and also prevent new DVTs. As patient is currently clinically stable, on room air, she is being discharged home today. Patient was advised to follow-up with PCP in 1 week and hematologist in 4 to 6 weeks considering recurrent PEs as outpatient. Lab/Data Reviewed    Discharge Exam:  Visit Vitals  /78 (BP 1 Location: Left arm, BP Patient Position: At rest)   Pulse (!) 115   Temp 98.9 °F (37.2 °C)   Resp 16   Ht 5' 4\" (1.626 m)   Wt 108.9 kg (240 lb)   SpO2 92%   BMI 41.20 kg/m²     General: Conscious, comfortable  Lungs:  CTA Bilaterally, No significant wheeze/rhonchi  Heart:  S1 S2 regular  Abdomen: Soft, Positive bowel sounds, NTND, No guarding/rigidity/rebound tend.   Neurologic:  AAOX3, No gross FND  Psych:             Appropriate mood    Disposition: home  Discharge Condition: Stable  Patient Instructions:   Current Discharge Medication List      START taking these medications    Details   apixaban (ELIQUIS) 5 mg tablet Take 2 Tabs by mouth two (2) times a day for 30 days. 10mg BID for 7 days, then change to 5mg BID  Qty: 120 Tab, Refills: 0    Comments: 10mg BID for 7 days, then change to 5mg BID      ferrous sulfate 325 mg (65 mg iron) tablet Take 1 Tab by mouth two (2) times daily (with meals) for 30 days. Qty: 60 Tab, Refills: 0         STOP taking these medications       rivaroxaban (Xarelto) 20 mg tab tablet Comments:   Reason for Stopping: Follow-up  Pt was advised to follow up with PCP in one week. Hematologist in 4 weeks.     Total time spent in discharge day management: 25 minutes    Signed:  Tim Taylor MD

## 2020-06-03 NOTE — PROGRESS NOTES
Critical Care Outreach Nurse Progress Report:    Subjective: In to assess pt secondary to transfer from ICU. MEWS Score: 2 (06/02/20 1925)    Vitals:    06/02/20 1621 06/02/20 1745 06/02/20 1925 06/02/20 1939   BP: 141/79 (!) 145/107 128/89    Pulse: 90 94 (!) 104 (!) 102   Resp: 28 29 20    Temp:   98.1 °F (36.7 °C)    SpO2: 93% 96% 96%    Weight:       Height:            LAB DATA:    Recent Labs     06/02/20  0400 06/01/20  0641 05/31/20  1726    143 137   K 4.1 4.4 4.8   * 109* 106   CO2 30 26 26   AGAP 3* 8 5*   GLU 95 107* 159*   BUN 11 10 9   CREA 0.65 0.82 0.90   GFRAA >60 >60 >60   GFRNA >60 >60 >60   CA 7.9* 8.5 9.0   ALB  --   --  3.5   TP  --   --  8.2   GLOB  --   --  4.7*   AGRAT  --   --  0.7*   ALT  --   --  15        Recent Labs     06/02/20  0952 06/02/20  0400 06/01/20  2226   WBC 9.3 10.2 10.2   HGB 10.3* 10.8* 11.3*   HCT 33.8* 35.0* 37.3    222 249        Objective: Patient asleep upon entering room. Pain Intensity 1: 0 (06/02/20 1921)        Patient Stated Pain Goal: 0    Assessment: A/O x 4. O2 sat 99% on room air, . States she is feeling better, no needs stated at this time. Plan: Will follow per outreach protocol.

## 2020-06-03 NOTE — ROUTINE PROCESS
Discharge instructions, follow up information, medication list, and prescriptions provided and explained to the pt. Patient given 30 day eliquis card by CM and information for follow up at [facility]. IV removed from left wrist and left upper arm, no remote telemetry on. Opportunity for questions provided. Instructed to call once ready to leave.

## 2020-06-03 NOTE — ROUTINE PROCESS
[address] Kristyn 3, 2020 RE: Sharon Lion To Whom It May Concern, This is to certify that Adrian [facility] was hospitalized from 5/31/2020 - 6/3/2020. She will follow up with her primary care physician in 1 week to determine return to work. Please feel free to contact my office if you have any questions or concerns. Thank you for your assistance in this matter.  
 
 
Sincerely, 
Martina Patterson RN

## 2020-06-03 NOTE — DISCHARGE INSTRUCTIONS
DISCHARGE SUMMARY from Nurse    PATIENT INSTRUCTIONS:    After general anesthesia or intravenous sedation, for 24 hours or while taking prescription Narcotics:  · Limit your activities  · Do not drive and operate hazardous machinery  · Do not make important personal or business decisions  · Do  not drink alcoholic beverages  · If you have not urinated within 8 hours after discharge, please contact your surgeon on call. Report the following to your surgeon:  · Excessive pain, swelling, redness or odor of or around the surgical area  · Temperature over 100.5  · Nausea and vomiting lasting longer than 4 hours or if unable to take medications  · Any signs of decreased circulation or nerve impairment to extremity: change in color, persistent  numbness, tingling, coldness or increase pain  · Any questions    What to do at Home:  Recommended activity: Activity as tolerated, ***    If you experience any of the following symptoms any new symptoms or fever above 100.5, please follow up with doctor or go to the ER. *  Please give a list of your current medications to your Primary Care Provider. *  Please update this list whenever your medications are discontinued, doses are      changed, or new medications (including over-the-counter products) are added. *  Please carry medication information at all times in case of emergency situations. These are general instructions for a healthy lifestyle:    No smoking/ No tobacco products/ Avoid exposure to second hand smoke  Surgeon General's Warning:  Quitting smoking now greatly reduces serious risk to your health.     Obesity, smoking, and sedentary lifestyle greatly increases your risk for illness    A healthy diet, regular physical exercise & weight monitoring are important for maintaining a healthy lifestyle    You may be retaining fluid if you have a history of heart failure or if you experience any of the following symptoms:  Weight gain of 3 pounds or more overnight or 5 pounds in a week, increased swelling in our hands or feet or shortness of breath while lying flat in bed. Please call your doctor as soon as you notice any of these symptoms; do not wait until your next office visit. The discharge information has been reviewed with the patient. The patient verbalized understanding. Discharge medications reviewed with the patient and appropriate educational materials and side effects teaching were provided. ___________________________________________________________________________________________________________________________________  DISCHARGE SUMMARY from Nurse    PATIENT INSTRUCTIONS:    After general anesthesia or intravenous sedation, for 24 hours or while taking prescription Narcotics:  · Limit your activities  · Do not drive and operate hazardous machinery  · Do not make important personal or business decisions  · Do  not drink alcoholic beverages  · If you have not urinated within 8 hours after discharge, please contact your surgeon on call. What to do at Home:  Recommended activity: Activity as tolerated. If you experience any of the following symptoms temp > 101.5, unrelieved pain, nausea or vomiting, shortness of breath or fatigue not relieved with rest, please follow up with MD.    *  Please give a list of your current medications to your Primary Care Provider. *  Please update this list whenever your medications are discontinued, doses are      changed, or new medications (including over-the-counter products) are added. *  Please carry medication information at all times in case of emergency situations. These are general instructions for a healthy lifestyle:    No smoking/ No tobacco products/ Avoid exposure to second hand smoke  Surgeon General's Warning:  Quitting smoking now greatly reduces serious risk to your health.     Obesity, smoking, and sedentary lifestyle greatly increases your risk for illness    A healthy diet, regular physical exercise & weight monitoring are important for maintaining a healthy lifestyle    You may be retaining fluid if you have a history of heart failure or if you experience any of the following symptoms:  Weight gain of 3 pounds or more overnight or 5 pounds in a week, increased swelling in our hands or feet or shortness of breath while lying flat in bed. Please call your doctor as soon as you notice any of these symptoms; do not wait until your next office visit. The discharge information has been reviewed with the patient. The patient verbalized understanding. Discharge medications reviewed with the patient and appropriate educational materials and side effects teaching were provided. Patient Education        Pulmonary Embolism: Care Instructions  Your Care Instructions     Pulmonary embolism is the sudden blockage of an artery in the lung. Blood clots in the deep veins of the leg or pelvis (deep vein thrombosis, or DVT) are the most common cause. These blood clots can travel to the lungs. Pulmonary embolism can be very serious. Because you have had one pulmonary embolism, you are at greater risk for having another one. But you can take steps to prevent another pulmonary embolism by following your doctor's instructions. You will probably take a prescription blood-thinning medicine to prevent blood clots. A blood thinner can stop a blood clot from growing larger and prevent new clots from forming. Follow-up care is a key part of your treatment and safety. Be sure to make and go to all appointments, and call your doctor if you are having problems. It's also a good idea to know your test results and keep a list of the medicines you take. How can you care for yourself at home? · Take your medicines exactly as prescribed. Call your doctor if you think you are having a problem with your medicine. You will get more details on the specific medicines your doctor prescribes.   · If you are taking a blood thinner, be sure you get instructions about how to take your medicine safely. Blood thinners can cause serious bleeding problems. Preventing future pulmonary embolisms  · Exercise. Keep blood moving in your legs to keep clots from forming. If you are traveling by car, stop every hour or so. Get out and walk around for a few minutes. If you are traveling by bus, train, or plane, get out of your seat and walk up and down the aisles every hour or so. You also can do leg exercises while you are seated. Pump your feet up and down by pulling your toes up toward your knees then pointing them down. · Get up out of bed as soon as possible after an illness or surgery. · Do not smoke. If you need help quitting, talk to your doctor about stop-smoking programs and medicines. These can increase your chances of quitting for good. · Check with your doctor before taking hormone or birth control pills. These may increase your risk of blood clots. · Ask your doctor about wearing compression stockings to help prevent blood clots in your legs. There are different types of stockings, and they need to fit right. So your doctor will recommend what you need. When should you call for help? [id number] anytime you think you may need emergency care. For example, call if:  · You have shortness of breath. · You have chest pain. · You passed out (lost consciousness). · You cough up blood. Call your doctor now or seek immediate medical care if:  · You have new or worsening pain or swelling in your leg. Watch closely for changes in your health, and be sure to contact your doctor if:  · You do not get better as expected. Where can you learn more? Go to http://jennyfer-hector.info/  Enter O595 in the search box to learn more about \"Pulmonary Embolism: Care Instructions. \"  Current as of: March 4, 2020               Content Version: 12.5  © 6906-6139 [employer].    Care instructions adapted under license by [address] (which disclaims liability or warranty for this information). If you have questions about a medical condition or this instruction, always ask your healthcare professional. [address] any warranty or liability for your use of this information.          ___________________________________________________________________________________________________________________________________

## 2020-06-03 NOTE — PROGRESS NOTES
Date of Outreach Update:  Cody Contreras was seen and assessed. MEWS Score: 2 (06/02/20 1925)      Pain Assessment  Pain Intensity 1: 0 (06/03/20 0327)        Patient Stated Pain Goal: 0      Previous Outreach assessment has been reviewed. There have been no significant clinical changes since the completion of the last dated Outreach assessment. Will continue to follow up per outreach protocol.     Signed By:   Maude Brunner, RN    Kristyn 3, 2020 5:31 AM

## 2020-12-06 ENCOUNTER — CLINICAL SUPPORT (OUTPATIENT)
Dept: URGENT CARE | Facility: CLINIC | Age: 35
End: 2020-12-06
Payer: MEDICAID

## 2020-12-06 DIAGNOSIS — Z20.822 EXPOSURE TO COVID-19 VIRUS: Primary | ICD-10-CM

## 2020-12-06 LAB
CTP QC/QA: YES
SARS-COV-2 RDRP RESP QL NAA+PROBE: NEGATIVE

## 2020-12-06 PROCEDURE — 99211 PR OFFICE/OUTPT VISIT, EST, LEVL I: ICD-10-PCS | Mod: S$GLB,,, | Performed by: NURSE PRACTITIONER

## 2020-12-06 PROCEDURE — 87635 SARS-COV-2 COVID-19 AMP PRB: CPT | Mod: QW,S$GLB,, | Performed by: NURSE PRACTITIONER

## 2020-12-06 PROCEDURE — 99211 OFF/OP EST MAY X REQ PHY/QHP: CPT | Mod: S$GLB,,, | Performed by: NURSE PRACTITIONER

## 2020-12-06 PROCEDURE — 87635: ICD-10-PCS | Mod: QW,S$GLB,, | Performed by: NURSE PRACTITIONER

## 2021-05-06 ENCOUNTER — OFFICE VISIT (OUTPATIENT)
Dept: URGENT CARE | Facility: CLINIC | Age: 36
End: 2021-05-06
Payer: MEDICAID

## 2021-05-06 VITALS
SYSTOLIC BLOOD PRESSURE: 128 MMHG | HEART RATE: 78 BPM | HEIGHT: 64 IN | DIASTOLIC BLOOD PRESSURE: 84 MMHG | WEIGHT: 250 LBS | TEMPERATURE: 98 F | BODY MASS INDEX: 42.68 KG/M2 | RESPIRATION RATE: 16 BRPM | OXYGEN SATURATION: 97 %

## 2021-05-06 DIAGNOSIS — J06.9 VIRAL URI: Primary | ICD-10-CM

## 2021-05-06 DIAGNOSIS — J02.9 SORE THROAT: ICD-10-CM

## 2021-05-06 PROBLEM — I26.92 ACUTE SADDLE PULMONARY EMBOLISM: Status: ACTIVE | Noted: 2020-05-31

## 2021-05-06 LAB
CTP QC/QA: YES
CTP QC/QA: YES
MOLECULAR STREP A: NEGATIVE
SARS-COV-2 RDRP RESP QL NAA+PROBE: NEGATIVE

## 2021-05-06 PROCEDURE — 87651 POCT STREP A MOLECULAR: ICD-10-PCS | Mod: QW,S$GLB,, | Performed by: NURSE PRACTITIONER

## 2021-05-06 PROCEDURE — 99213 OFFICE O/P EST LOW 20 MIN: CPT | Mod: S$GLB,,, | Performed by: NURSE PRACTITIONER

## 2021-05-06 PROCEDURE — 87651 STREP A DNA AMP PROBE: CPT | Mod: QW,S$GLB,, | Performed by: NURSE PRACTITIONER

## 2021-05-06 PROCEDURE — U0002 COVID-19 LAB TEST NON-CDC: HCPCS | Mod: QW,S$GLB,, | Performed by: NURSE PRACTITIONER

## 2021-05-06 PROCEDURE — U0002: ICD-10-PCS | Mod: QW,S$GLB,, | Performed by: NURSE PRACTITIONER

## 2021-05-06 PROCEDURE — 99213 PR OFFICE/OUTPT VISIT, EST, LEVL III, 20-29 MIN: ICD-10-PCS | Mod: S$GLB,,, | Performed by: NURSE PRACTITIONER

## 2021-05-06 RX ORDER — BENZONATATE 200 MG/1
200 CAPSULE ORAL 3 TIMES DAILY PRN
Qty: 30 CAPSULE | Refills: 0 | Status: SHIPPED | OUTPATIENT
Start: 2021-05-06

## 2023-07-31 ENCOUNTER — PATIENT MESSAGE (OUTPATIENT)
Dept: RESEARCH | Facility: HOSPITAL | Age: 38
End: 2023-07-31
Payer: MEDICAID